# Patient Record
Sex: FEMALE | Race: WHITE | NOT HISPANIC OR LATINO | Employment: UNEMPLOYED | URBAN - METROPOLITAN AREA
[De-identification: names, ages, dates, MRNs, and addresses within clinical notes are randomized per-mention and may not be internally consistent; named-entity substitution may affect disease eponyms.]

---

## 2018-01-31 ENCOUNTER — OFFICE VISIT CONVERTED (OUTPATIENT)
Dept: FAMILY MEDICINE CLINIC | Facility: CLINIC | Age: 33
End: 2018-01-31
Attending: NURSE PRACTITIONER

## 2018-03-30 ENCOUNTER — OFFICE VISIT CONVERTED (OUTPATIENT)
Dept: FAMILY MEDICINE CLINIC | Facility: CLINIC | Age: 33
End: 2018-03-30
Attending: NURSE PRACTITIONER

## 2018-10-12 ENCOUNTER — CONVERSION ENCOUNTER (OUTPATIENT)
Dept: FAMILY MEDICINE CLINIC | Facility: CLINIC | Age: 33
End: 2018-10-12

## 2018-10-12 ENCOUNTER — OFFICE VISIT CONVERTED (OUTPATIENT)
Dept: FAMILY MEDICINE CLINIC | Facility: CLINIC | Age: 33
End: 2018-10-12
Attending: NURSE PRACTITIONER

## 2019-04-19 ENCOUNTER — HOSPITAL ENCOUNTER (OUTPATIENT)
Dept: LAB | Facility: HOSPITAL | Age: 34
Discharge: HOME | End: 2019-04-19
Attending: NURSE PRACTITIONER

## 2019-04-19 LAB
25(OH)D3 SERPL-MCNC: 47.6 NG/ML (ref 30–100)
ALBUMIN SERPL-MCNC: 4.2 G/DL (ref 3.5–5)
ALBUMIN/GLOB SERPL: 1.3 {RATIO} (ref 1.4–2.6)
ALP SERPL-CCNC: 118 U/L (ref 42–98)
ALT SERPL-CCNC: 21 U/L (ref 10–40)
ANION GAP SERPL CALC-SCNC: 12 MMOL/L (ref 8–19)
AST SERPL-CCNC: 24 U/L (ref 15–50)
BASOPHILS # BLD AUTO: 0.04 10*3/UL (ref 0–0.2)
BASOPHILS NFR BLD AUTO: 0.7 % (ref 0–3)
BILIRUB SERPL-MCNC: 0.26 MG/DL (ref 0.2–1.3)
BUN SERPL-MCNC: 15 MG/DL (ref 5–25)
BUN/CREAT SERPL: 19 {RATIO} (ref 6–20)
CALCIUM SERPL-MCNC: 9 MG/DL (ref 8.7–10.4)
CHLORIDE SERPL-SCNC: 104 MMOL/L (ref 99–111)
CHOLEST SERPL-MCNC: 178 MG/DL (ref 107–200)
CHOLEST/HDLC SERPL: 2.9 {RATIO} (ref 3–6)
CONV ABS IMM GRAN: 0.02 10*3/UL (ref 0–0.2)
CONV CO2: 27 MMOL/L (ref 22–32)
CONV IMMATURE GRAN: 0.3 % (ref 0–1.8)
CONV TOTAL PROTEIN: 7.4 G/DL (ref 6.3–8.2)
CREAT UR-MCNC: 0.8 MG/DL (ref 0.5–0.9)
DEPRECATED RDW RBC AUTO: 42.5 FL (ref 36.4–46.3)
EOSINOPHIL # BLD AUTO: 0.3 10*3/UL (ref 0–0.7)
EOSINOPHIL # BLD AUTO: 5.2 % (ref 0–7)
ERYTHROCYTE [DISTWIDTH] IN BLOOD BY AUTOMATED COUNT: 12.6 % (ref 11.7–14.4)
GFR SERPLBLD BASED ON 1.73 SQ M-ARVRAT: >60 ML/MIN/{1.73_M2}
GLOBULIN UR ELPH-MCNC: 3.2 G/DL (ref 2–3.5)
GLUCOSE SERPL-MCNC: 88 MG/DL (ref 65–99)
HBA1C MFR BLD: 12.5 G/DL (ref 12–16)
HCT VFR BLD AUTO: 39.6 % (ref 37–47)
HDLC SERPL-MCNC: 61 MG/DL (ref 40–60)
LDLC SERPL CALC-MCNC: 109 MG/DL (ref 70–100)
LYMPHOCYTES # BLD AUTO: 2.15 10*3/UL (ref 1–5)
MCH RBC QN AUTO: 29.1 PG (ref 27–31)
MCHC RBC AUTO-ENTMCNC: 31.6 G/DL (ref 33–37)
MCV RBC AUTO: 92.1 FL (ref 81–99)
MONOCYTES # BLD AUTO: 0.37 10*3/UL (ref 0.2–1.2)
MONOCYTES NFR BLD AUTO: 6.4 % (ref 3–10)
NEUTROPHILS # BLD AUTO: 2.91 10*3/UL (ref 2–8)
NEUTROPHILS NFR BLD AUTO: 50.3 % (ref 30–85)
NRBC CBCN: 0 % (ref 0–0.7)
OSMOLALITY SERPL CALC.SUM OF ELEC: 288 MOSM/KG (ref 273–304)
PLATELET # BLD AUTO: 371 10*3/UL (ref 130–400)
PMV BLD AUTO: 9.9 FL (ref 9.4–12.3)
POTASSIUM SERPL-SCNC: 4.4 MMOL/L (ref 3.5–5.3)
RBC # BLD AUTO: 4.3 10*6/UL (ref 4.2–5.4)
SODIUM SERPL-SCNC: 139 MMOL/L (ref 135–147)
TRIGL SERPL-MCNC: 41 MG/DL (ref 40–150)
VARIANT LYMPHS NFR BLD MANUAL: 37.1 % (ref 20–45)
VLDLC SERPL-MCNC: 8 MG/DL (ref 5–37)
WBC # BLD AUTO: 5.79 10*3/UL (ref 4.8–10.8)

## 2019-04-20 LAB
T4 FREE SERPL-MCNC: 1.6 NG/DL (ref 0.9–1.8)
TSH SERPL-ACNC: 1.04 M[IU]/L (ref 0.27–4.2)

## 2019-04-22 ENCOUNTER — OFFICE VISIT CONVERTED (OUTPATIENT)
Dept: FAMILY MEDICINE CLINIC | Facility: CLINIC | Age: 34
End: 2019-04-22
Attending: NURSE PRACTITIONER

## 2019-05-03 ENCOUNTER — OFFICE VISIT CONVERTED (OUTPATIENT)
Dept: FAMILY MEDICINE CLINIC | Facility: CLINIC | Age: 34
End: 2019-05-03
Attending: NURSE PRACTITIONER

## 2019-05-03 ENCOUNTER — HOSPITAL ENCOUNTER (OUTPATIENT)
Dept: FAMILY MEDICINE CLINIC | Facility: CLINIC | Age: 34
Discharge: HOME | End: 2019-05-03
Attending: NURSE PRACTITIONER

## 2019-05-08 LAB
CONV LAST MENSTURAL PERIOD: NORMAL
SPECIMEN SOURCE: NORMAL
SPECIMEN SOURCE: NORMAL
THIN PREP CVX: NORMAL

## 2020-01-02 ENCOUNTER — HOSPITAL ENCOUNTER (OUTPATIENT)
Dept: URGENT CARE | Facility: CLINIC | Age: 35
Discharge: HOME | End: 2020-01-02
Attending: EMERGENCY MEDICINE

## 2020-01-04 LAB — BACTERIA SPEC AEROBE CULT: NORMAL

## 2020-04-24 ENCOUNTER — HOSPITAL ENCOUNTER (OUTPATIENT)
Dept: LAB | Facility: HOSPITAL | Age: 35
Discharge: HOME | End: 2020-04-24
Attending: NURSE PRACTITIONER

## 2020-04-24 LAB
25(OH)D3 SERPL-MCNC: 46 NG/ML (ref 30–100)
ALBUMIN SERPL-MCNC: 4.1 G/DL (ref 3.5–5)
ALBUMIN/GLOB SERPL: 1.3 {RATIO} (ref 1.4–2.6)
ALP SERPL-CCNC: 122 U/L (ref 42–98)
ALT SERPL-CCNC: 16 U/L (ref 10–40)
ANION GAP SERPL CALC-SCNC: 16 MMOL/L (ref 8–19)
AST SERPL-CCNC: 16 U/L (ref 15–50)
BASOPHILS # BLD AUTO: 0.04 10*3/UL (ref 0–0.2)
BASOPHILS NFR BLD AUTO: 0.6 % (ref 0–3)
BILIRUB SERPL-MCNC: 0.52 MG/DL (ref 0.2–1.3)
BUN SERPL-MCNC: 14 MG/DL (ref 5–25)
BUN/CREAT SERPL: 20 {RATIO} (ref 6–20)
CALCIUM SERPL-MCNC: 9 MG/DL (ref 8.7–10.4)
CHLORIDE SERPL-SCNC: 100 MMOL/L (ref 99–111)
CHOLEST SERPL-MCNC: 154 MG/DL (ref 107–200)
CHOLEST/HDLC SERPL: 3.1 {RATIO} (ref 3–6)
CONV ABS IMM GRAN: 0.02 10*3/UL (ref 0–0.2)
CONV CO2: 22 MMOL/L (ref 22–32)
CONV IMMATURE GRAN: 0.3 % (ref 0–1.8)
CONV TOTAL PROTEIN: 7.2 G/DL (ref 6.3–8.2)
CREAT UR-MCNC: 0.71 MG/DL (ref 0.5–0.9)
DEPRECATED RDW RBC AUTO: 42 FL (ref 36.4–46.3)
EOSINOPHIL # BLD AUTO: 0.16 10*3/UL (ref 0–0.7)
EOSINOPHIL # BLD AUTO: 2.5 % (ref 0–7)
ERYTHROCYTE [DISTWIDTH] IN BLOOD BY AUTOMATED COUNT: 12.6 % (ref 11.7–14.4)
GFR SERPLBLD BASED ON 1.73 SQ M-ARVRAT: >60 ML/MIN/{1.73_M2}
GLOBULIN UR ELPH-MCNC: 3.1 G/DL (ref 2–3.5)
GLUCOSE SERPL-MCNC: 87 MG/DL (ref 65–99)
HCT VFR BLD AUTO: 40.9 % (ref 37–47)
HDLC SERPL-MCNC: 49 MG/DL (ref 40–60)
HGB BLD-MCNC: 13.1 G/DL (ref 12–16)
LDLC SERPL CALC-MCNC: 93 MG/DL (ref 70–100)
LYMPHOCYTES # BLD AUTO: 2.04 10*3/UL (ref 1–5)
LYMPHOCYTES NFR BLD AUTO: 31.5 % (ref 20–45)
MCH RBC QN AUTO: 29.1 PG (ref 27–31)
MCHC RBC AUTO-ENTMCNC: 32 G/DL (ref 33–37)
MCV RBC AUTO: 90.9 FL (ref 81–99)
MONOCYTES # BLD AUTO: 0.46 10*3/UL (ref 0.2–1.2)
MONOCYTES NFR BLD AUTO: 7.1 % (ref 3–10)
NEUTROPHILS # BLD AUTO: 3.76 10*3/UL (ref 2–8)
NEUTROPHILS NFR BLD AUTO: 58 % (ref 30–85)
NRBC CBCN: 0 % (ref 0–0.7)
OSMOLALITY SERPL CALC.SUM OF ELEC: 278 MOSM/KG (ref 273–304)
PLATELET # BLD AUTO: 308 10*3/UL (ref 130–400)
PMV BLD AUTO: 10.2 FL (ref 9.4–12.3)
POTASSIUM SERPL-SCNC: 3.9 MMOL/L (ref 3.5–5.3)
RBC # BLD AUTO: 4.5 10*6/UL (ref 4.2–5.4)
SODIUM SERPL-SCNC: 134 MMOL/L (ref 135–147)
T4 FREE SERPL-MCNC: 1.6 NG/DL (ref 0.9–1.8)
TRIGL SERPL-MCNC: 62 MG/DL (ref 40–150)
TSH SERPL-ACNC: 1.46 M[IU]/L (ref 0.27–4.2)
VLDLC SERPL-MCNC: 12 MG/DL (ref 5–37)
WBC # BLD AUTO: 6.48 10*3/UL (ref 4.8–10.8)

## 2020-04-27 ENCOUNTER — CONVERSION ENCOUNTER (OUTPATIENT)
Dept: FAMILY MEDICINE CLINIC | Facility: CLINIC | Age: 35
End: 2020-04-27

## 2020-04-27 ENCOUNTER — OFFICE VISIT CONVERTED (OUTPATIENT)
Dept: FAMILY MEDICINE CLINIC | Facility: CLINIC | Age: 35
End: 2020-04-27
Attending: NURSE PRACTITIONER

## 2020-05-29 ENCOUNTER — OFFICE VISIT CONVERTED (OUTPATIENT)
Dept: FAMILY MEDICINE CLINIC | Facility: CLINIC | Age: 35
End: 2020-05-29
Attending: NURSE PRACTITIONER

## 2020-06-29 ENCOUNTER — OFFICE VISIT CONVERTED (OUTPATIENT)
Dept: FAMILY MEDICINE CLINIC | Facility: CLINIC | Age: 35
End: 2020-06-29
Attending: NURSE PRACTITIONER

## 2020-07-29 ENCOUNTER — OFFICE VISIT CONVERTED (OUTPATIENT)
Dept: FAMILY MEDICINE CLINIC | Facility: CLINIC | Age: 35
End: 2020-07-29
Attending: NURSE PRACTITIONER

## 2020-09-01 ENCOUNTER — OFFICE VISIT CONVERTED (OUTPATIENT)
Dept: FAMILY MEDICINE CLINIC | Facility: CLINIC | Age: 35
End: 2020-09-01
Attending: NURSE PRACTITIONER

## 2021-03-09 ENCOUNTER — HOSPITAL ENCOUNTER (OUTPATIENT)
Dept: LAB | Facility: HOSPITAL | Age: 36
Discharge: HOME | End: 2021-03-09
Attending: NURSE PRACTITIONER

## 2021-03-09 LAB
ALBUMIN SERPL-MCNC: 3.8 G/DL (ref 3.5–5)
ALBUMIN/GLOB SERPL: 1.2 {RATIO} (ref 1.4–2.6)
ALP SERPL-CCNC: 116 U/L (ref 42–98)
ALT SERPL-CCNC: 10 U/L (ref 10–40)
ANION GAP SERPL CALC-SCNC: 12 MMOL/L (ref 8–19)
AST SERPL-CCNC: 16 U/L (ref 15–50)
BASOPHILS # BLD AUTO: 0.04 10*3/UL (ref 0–0.2)
BASOPHILS NFR BLD AUTO: 0.7 % (ref 0–3)
BILIRUB SERPL-MCNC: 0.38 MG/DL (ref 0.2–1.3)
BUN SERPL-MCNC: 14 MG/DL (ref 5–25)
BUN/CREAT SERPL: 19 {RATIO} (ref 6–20)
CALCIUM SERPL-MCNC: 8.8 MG/DL (ref 8.7–10.4)
CHLORIDE SERPL-SCNC: 104 MMOL/L (ref 99–111)
CHOLEST SERPL-MCNC: 146 MG/DL (ref 107–200)
CHOLEST/HDLC SERPL: 2.6 {RATIO} (ref 3–6)
CONV ABS IMM GRAN: 0.02 10*3/UL (ref 0–0.2)
CONV CO2: 27 MMOL/L (ref 22–32)
CONV IMMATURE GRAN: 0.4 % (ref 0–1.8)
CONV TOTAL PROTEIN: 6.9 G/DL (ref 6.3–8.2)
CREAT UR-MCNC: 0.73 MG/DL (ref 0.5–0.9)
DEPRECATED RDW RBC AUTO: 43 FL (ref 36.4–46.3)
EOSINOPHIL # BLD AUTO: 0.15 10*3/UL (ref 0–0.7)
EOSINOPHIL # BLD AUTO: 2.6 % (ref 0–7)
ERYTHROCYTE [DISTWIDTH] IN BLOOD BY AUTOMATED COUNT: 13 % (ref 11.7–14.4)
GFR SERPLBLD BASED ON 1.73 SQ M-ARVRAT: >60 ML/MIN/{1.73_M2}
GLOBULIN UR ELPH-MCNC: 3.1 G/DL (ref 2–3.5)
GLUCOSE SERPL-MCNC: 81 MG/DL (ref 65–99)
HCT VFR BLD AUTO: 38.6 % (ref 37–47)
HDLC SERPL-MCNC: 56 MG/DL (ref 40–60)
HGB BLD-MCNC: 12.4 G/DL (ref 12–16)
LDLC SERPL CALC-MCNC: 76 MG/DL (ref 70–100)
LYMPHOCYTES # BLD AUTO: 1.8 10*3/UL (ref 1–5)
LYMPHOCYTES NFR BLD AUTO: 31.6 % (ref 20–45)
MCH RBC QN AUTO: 29.5 PG (ref 27–31)
MCHC RBC AUTO-ENTMCNC: 32.1 G/DL (ref 33–37)
MCV RBC AUTO: 91.9 FL (ref 81–99)
MONOCYTES # BLD AUTO: 0.39 10*3/UL (ref 0.2–1.2)
MONOCYTES NFR BLD AUTO: 6.9 % (ref 3–10)
NEUTROPHILS # BLD AUTO: 3.29 10*3/UL (ref 2–8)
NEUTROPHILS NFR BLD AUTO: 57.8 % (ref 30–85)
NRBC CBCN: 0 % (ref 0–0.7)
OSMOLALITY SERPL CALC.SUM OF ELEC: 288 MOSM/KG (ref 273–304)
PLATELET # BLD AUTO: 314 10*3/UL (ref 130–400)
PMV BLD AUTO: 9.8 FL (ref 9.4–12.3)
POTASSIUM SERPL-SCNC: 4.1 MMOL/L (ref 3.5–5.3)
RBC # BLD AUTO: 4.2 10*6/UL (ref 4.2–5.4)
SODIUM SERPL-SCNC: 139 MMOL/L (ref 135–147)
TRIGL SERPL-MCNC: 68 MG/DL (ref 40–150)
TSH SERPL-ACNC: 3 M[IU]/L (ref 0.27–4.2)
VLDLC SERPL-MCNC: 14 MG/DL (ref 5–37)
WBC # BLD AUTO: 5.69 10*3/UL (ref 4.8–10.8)

## 2021-03-24 ENCOUNTER — OFFICE VISIT CONVERTED (OUTPATIENT)
Dept: FAMILY MEDICINE CLINIC | Facility: CLINIC | Age: 36
End: 2021-03-24
Attending: NURSE PRACTITIONER

## 2021-03-24 ENCOUNTER — CONVERSION ENCOUNTER (OUTPATIENT)
Dept: FAMILY MEDICINE CLINIC | Facility: CLINIC | Age: 36
End: 2021-03-24

## 2021-05-12 NOTE — PROGRESS NOTES
Progress Note      Patient Name: Jeni De Santiago   Patient ID: [id number]   Sex: Female   YOB: 1985        Visit Date: April 27, 2020    Provider: BRETT Orourke   Location: [facility]   Location Address: [address] [place] [address], [address] [place] [address]  [id number]   Location Phone: [phone]          Chief Complaint  · 1 year follow up   · Discuss weight management   · Patient is on no carbs or sugar diet       History Of Present Illness  Jeni De Santiago is a 35 year old /White female who presents for evaluation and treatment of:      Patient presents to the office today for one-year follow-up.  I did review all her recent lab work with the patient which is unremarkable.  Patient does state that she has reduced her carbs or sugars and stop drinking sodas.  Patient states that she is increased her activity yet she is not losing any weight.  She does want to discuss weight management options.  We did discuss medications including the risk and benefits as well as adverse side effects.       Past Medical History  Elevated blood pressure; High blood pressure; Snoring; Weight gain         Past Surgical History  *Denies any surgical procedures         Medication List  lisinopril 10 mg oral tablet; Synthroid 100 mcg oral tablet; Vitamin D3 50 mcg (2,000 unit) oral capsule         Allergy List  NO KNOWN DRUG ALLERGIES         Family Medical History  *No Known Family History         Social History  Alcohol (Never); Tobacco (Never)         Immunizations  Name Date Admin   Influenza Refused   Influenza Refused         Review of Systems  · Constitutional  o Denies  o : fever, fatigue, weight loss, weight gain  · Cardiovascular  o Denies  o : lower extremity edema, claudication, chest pressure, palpitations  · Respiratory  o Denies  o : shortness of breath, wheezing, cough, hemoptysis, dyspnea on exertion  · Gastrointestinal  o Denies  o : nausea, vomiting, diarrhea, constipation, abdominal  "pain      Vitals  Date Time BP Position Site L\R Cuff Size HR RR TEMP (F) WT  HT  BMI kg/m2 BSA m2 O2 Sat HC       04/27/2020 03:06 /70 Sitting    94 - R 18 98.9 180lbs 0oz 5'  3\" 31.89 1.91 98 %          Physical Examination  · Constitutional  o Appearance  o : well-nourished, in no acute distress  · Neck  o Inspection/Palpation  o : normal appearance, no masses or tenderness, trachea midline  o Range of Motion  o : cervical range of motion within normal limits  o Thyroid  o : gland size normal, nontender, no nodules or masses present on palpation  · Respiratory  o Respiratory Effort  o : breathing unlabored  o Inspection of Chest  o : normal appearance  o Auscultation of Lungs  o : normal breath sounds throughout inspiration and expiration  · Cardiovascular  o Heart  o :   § Auscultation of Heart  § : regular rate and rhythm, no murmurs, gallops or rubs  o Peripheral Vascular System  o :   § Extremities  § : no clubbing or edema  · Skin and Subcutaneous Tissue  o General Inspection  o : no rashes or lesions present, no areas of discoloration  o Body Hair  o : hair normal for age, general body hair distribution normal for age  o Digits and Nails  o : no clubbing, cyanosis, deformities or edema present, normal appearing nails  · Neurologic  o Mental Status Examination  o :   § Orientation  § : grossly oriented to person, place and time  o Gait and Station  o : normal gait, able to stand without difficulty  · Psychiatric  o Judgement and Insight  o : judgment and insight intact  o Mood and Affect  o : mood normal, affect appropriate  o Presence of Abnormal Thoughts  o : no hallucinations, no delusions present, no psychotic thoughts          Results  · In-Office Procedures  o Lab procedure  § IOP - Urine Drug Screen In-House [facility] ([address])   § Amphetamines Ur Ql: Negative   § Barbiturates Ur Ql: Negative   § Buprenorphine+Nor Ur Ql Scn: Negative   § Benzodiaz Ur Ql: Negative   § Cocaine Ur Ql: Negative "   § Methadone Ur Ql: Negative   § Methamphet Ur Ql: Negative   § MDMA Ur Ql Scn: Negative   § Opiates Ur Ql: Negative   § Oxycodone Ur Ql: Negative   § PCP Ur Ql: Negative   § THC Ur Ql: Negative   § Temp in Range?: Within/Acceptable   § Control Seen?: Yes       Assessment  · Essential hypertension     401.9/I10  · Hypothyroidism     244.9/E03.9  · Vitamin D deficiency     268.9/E55.9  · Obesity (BMI 30-39.9)     278.00/E66.9      Plan  · Orders  o BORIS Report (KASPR) - - 04/27/2020  o ACO-17: Screened for tobacco use AND received tobacco cessation intervention (4004F) - - 04/27/2020  o ACO-39: Current medications updated and reviewed () - - 04/27/2020  o ACO-14: Influenza immunization was not administered for reasons documented () - - 04/27/2020  o Urine Drug Screen ([facility]) (53729) - - 04/27/2020  · Medications  o phentermine 37.5 mg oral tablet   SIG: take 1 tablet (37.5 mg) by oral route once daily before breakfast   DISP: (30) tablets with 0 refills  Prescribed on 04/27/2020     o Medications have been Reconciled  o Transition of Care or Provider Policy  · Instructions  o Patient advised to monitor blood pressure (B/P) at home and journal readings. Patient informed that a B/P reading at home of more than 130/80 is considered hypertension. For readings greater seox998/90 or higher patient is advised to follow up in the office with readings for management. Patient advised to limit sodium intake.  o Patient was educated/instructed on their diagnosis, treatment and medications prior to discharge from the clinic today.  o Time spent with the patient was minutes, more than 50% face to face.  o Electronically Identified Patient Education Materials Provided Electronically  · Disposition  o Call or Return if symptoms worsen or persist.  o Follow up in 1 month            Electronically Signed by: BRETT Orourke -Author on April 27, 2020 04:08:30 PM

## 2021-05-13 NOTE — PROGRESS NOTES
Progress Note      Patient Name: Jeni De Santiago   Patient ID: [id number]   Sex: Female   YOB: 1985        Visit Date: June 29, 2020    Provider: BRETT Orourke   Location: [facility]   Location Address: [address] [place] [address], [address] [place] [address]  [id number]   Location Phone: [phone]          Chief Complaint  · Follow up weight      History Of Present Illness  Jeni De Santiago is a 35 year old /White female who presents for evaluation and treatment of:      Patient presents to the office today for one-month follow-up on her weight management.  Patient's current weight is 165.  She has 5 pound weight loss since her last visit.  She denies any chest pain shortness breath palpitations this time.  She denies any constipation or dry mouth.  Patient states that she would continue this regimen.   She denies any other concerns or complaints at this time  She continues to eat healthy and increase her exercise       Past Medical History  Elevated blood pressure; High blood pressure; Snoring; Weight gain         Past Surgical History  *Denies any surgical procedures         Medication List  lisinopril 10 mg oral tablet; phentermine 37.5 mg oral tablet; Synthroid 100 mcg oral tablet; Vitamin D3 50 mcg (2,000 unit) oral capsule         Allergy List  NO KNOWN DRUG ALLERGIES         Family Medical History  *No Known Family History         Social History  Alcohol (Never); Tobacco (Never)         Immunizations  Name Date Admin   Influenza Refused   Influenza Refused         Review of Systems  · Constitutional  o Denies  o : fever, fatigue, weight loss, weight gain  · Cardiovascular  o Denies  o : lower extremity edema, claudication, chest pressure, palpitations  · Respiratory  o Denies  o : shortness of breath, wheezing, cough, hemoptysis, dyspnea on exertion  · Gastrointestinal  o Denies  o : nausea, vomiting, diarrhea, constipation, abdominal pain      Vitals  Date Time BP Position Site L\R  "Cuff Size HR RR TEMP (F) WT  HT  BMI kg/m2 BSA m2 O2 Sat HC       06/29/2020 01:08 /78 Sitting    102 - R 16 98.5 165lbs 0oz 5'  3\" 29.23 1.82 100 %          Physical Examination  · Constitutional  o Appearance  o : well-nourished, in no acute distress  · Neck  o Inspection/Palpation  o : normal appearance, no masses or tenderness, trachea midline  o Range of Motion  o : cervical range of motion within normal limits  o Thyroid  o : gland size normal, nontender, no nodules or masses present on palpation  · Respiratory  o Respiratory Effort  o : breathing unlabored  o Inspection of Chest  o : normal appearance  o Auscultation of Lungs  o : normal breath sounds throughout inspiration and expiration  · Cardiovascular  o Heart  o :   § Auscultation of Heart  § : regular rate and rhythm, no murmurs, gallops or rubs  o Peripheral Vascular System  o :   § Extremities  § : no clubbing or edema  · Skin and Subcutaneous Tissue  o General Inspection  o : no rashes or lesions present, no areas of discoloration  o Body Hair  o : hair normal for age, general body hair distribution normal for age  o Digits and Nails  o : no clubbing, cyanosis, deformities or edema present, normal appearing nails  · Neurologic  o Mental Status Examination  o :   § Orientation  § : grossly oriented to person, place and time  o Gait and Station  o : normal gait, able to stand without difficulty  · Psychiatric  o Judgement and Insight  o : judgment and insight intact  o Mood and Affect  o : mood normal, affect appropriate  o Presence of Abnormal Thoughts  o : no hallucinations, no delusions present, no psychotic thoughts          Assessment  · Overweight     278.02/E66.3  · BMI 29.0-29.9,adult     V85.25/Z68.29    Problems Reconciled  Plan  · Orders  o ACO-39: Current medications updated and reviewed () - - 06/29/2020  o ACO-14: Influenza immunization was not administered for reasons documented () - - " 06/29/2020  · Medications  o phentermine 37.5 mg oral tablet   SIG: take 1 tablet (37.5 mg) by oral route once daily before breakfast   DISP: (30) tablets with 0 refills  Refilled on 06/29/2020     o Medications have been Reconciled  o Transition of Care or Provider Policy  · Instructions  o Patient was educated/instructed on their diagnosis, treatment and medications prior to discharge from the clinic today.  o Time spent with the patient was minutes, more than 50% face to face.  o Electronically Identified Patient Education Materials Provided Electronically  · Disposition  o Call or Return if symptoms worsen or persist.  o Follow up in 1 month            Electronically Signed by: BRETT Orourke -Author on June 29, 2020 01:16:14 PM

## 2021-05-13 NOTE — PROGRESS NOTES
Progress Note      Patient Name: Jeni De Santiago   Patient ID: [id number]   Sex: Female   YOB: 1985    Primary Care Provider: Rafael WEST    Visit Date: July 29, 2020    Provider: BRETT Orourke   Location: [facility]   Location Address: [address] [place] [address], [address]  [id number]   Location Phone: [phone]          Chief Complaint     1 month follow up on weight       History Of Present Illness  Jeni De Santiago is a 35 year old /White female who presents for evaluation and treatment of:      Pt presents to the office today for f/u of weight loss on phentermine.  She is down 7 pounds since her last months visit.  She denies any side effects or problems with the medication.    Due to the patient losing 7 pounds we will continue the medication for 1 more month.       Past Medical History  Disease Name Date Onset Notes   Elevated blood pressure 12/13/2016 --    High blood pressure --  --    Snoring 12/13/2016 --    Weight gain 12/13/2016 --          Past Surgical History  Procedure Name Date Notes   *Denies any surgical procedures --  --          Medication List  Name Date Started Instructions   lisinopril 10 mg oral tablet 04/10/2020 take 1 tablet (10 mg) by oral route once daily   phentermine 37.5 mg oral tablet 07/29/2020 take 1 tablet (37.5 mg) by oral route once daily before breakfast   Synthroid 100 mcg oral tablet 01/29/2020 take 1 tablet (100 mcg) by oral route once daily for 90 days   Vitamin D3 50 mcg (2,000 unit) oral capsule 04/10/2020 take 1 capsule by oral route daily for 90 days         Allergy List  Allergen Name Date Reaction Notes   NO KNOWN DRUG ALLERGIES --  --  --          Family Medical History  Disease Name Relative/Age Notes   *No Known Family History  --          Social History  Finding Status Start/Stop Quantity Notes   Alcohol Never --/-- --  --    Tobacco Never --/-- --  --          Immunizations  NameDate Admin Mfg Trade Name Lot Number  "Route Inj VIS Given VIS Publication   InfluenzaRefused 04/27/2020 NE Not Entered  NE NE     Comments:    InfluenzaRefused 05/03/2019 NE Not Entered  NE NE     Comments:          Review of Systems  · Constitutional  o Denies  o : fatigue, night sweats  · Eyes  o Denies  o : double vision, blurred vision  · HENT  o Denies  o : vertigo, recent head injury  · Breasts  o Denies  o : abnormal changes in breast size, additional breast symptoms except as noted in the HPI  · Cardiovascular  o Denies  o : chest pain, irregular heart beats  · Respiratory  o Denies  o : shortness of breath, productive cough  · Gastrointestinal  o Denies  o : nausea, vomiting  · Genitourinary  o Denies  o : dysuria, urinary retention  · Integument  o Denies  o : hair growth change, new skin lesions  · Neurologic  o Denies  o : altered mental status, seizures  · Musculoskeletal  o Denies  o : joint swelling, limitation of motion  · Endocrine  o Denies  o : cold intolerance, heat intolerance  · Heme-Lymph  o Denies  o : petechiae, lymph node enlargement or tenderness  · Allergic-Immunologic  o Denies  o : frequent illnesses      Vitals  Date Time BP Position Site L\R Cuff Size HR RR TEMP (F) WT  HT  BMI kg/m2 BSA m2 O2 Sat        07/29/2020 01:09 /72 Sitting    102 - R 18 97.8 157lbs 16oz 5'  3\" 27.99 1.78 97 %          Physical Examination  · Constitutional  o Appearance  o : well-nourished, in no acute distress  · Eyes  o Conjunctivae  o : conjunctivae normal  o Sclerae  o : sclerae white  o Pupils and Irises  o : pupils equal and round  o Eyelids/Ocular Adnexae  o : eyelid appearance normal, no exudates present  · Respiratory  o Respiratory Effort  o : breathing unlabored  o Inspection of Chest  o : normal appearance  o Auscultation of Lungs  o : normal breath sounds throughout inspiration and expiration  · Cardiovascular  o Heart  o :   § Auscultation of Heart  § : regular rate and rhythm, no murmurs, gallops or rubs  o Peripheral " Vascular System  o :   § Extremities  § : no clubbing or edema  · Skin and Subcutaneous Tissue  o General Inspection  o : no rashes or lesions present, no areas of discoloration  o Body Hair  o : hair normal for age, general body hair distribution normal for age  o Digits and Nails  o : no clubbing, cyanosis, deformities or edema present, normal appearing nails  · Neurologic  o Mental Status Examination  o :   § Orientation  § : grossly oriented to person, place and time  o Gait and Station  o : normal gait, able to stand without difficulty  · Psychiatric  o Judgement and Insight  o : judgment and insight intact  o Mood and Affect  o : mood normal, affect appropriate  o Presence of Abnormal Thoughts  o : no hallucinations, no delusions present, no psychotic thoughts          Assessment  · Overweight     278.02/E66.3  · BMI 27.0-27.9,adult     V85.23/Z68.27    Problems Reconciled  Plan  · Orders  o ACO-39: Current medications updated and reviewed () - - 07/29/2020  o ACO-14: Influenza immunization was not administered for reasons documented () - - 07/29/2020  · Medications  o phentermine 37.5 mg oral tablet   SIG: take 1 tablet (37.5 mg) by oral route once daily before breakfast   DISP: (30) tablets with 0 refills  Refilled on 07/29/2020     o Medications have been Reconciled  o Transition of Care or Provider Policy  · Instructions  o Patient was educated/instructed on their diagnosis, treatment and medications prior to discharge from the clinic today.  o Time spent with the patient was minutes, more than 50% face to face.  o Electronically Identified Patient Education Materials Provided Electronically  · Disposition  o Call or Return if symptoms worsen or persist.  o Follow up in 1 month            Electronically Signed by: BRETT Orourke -Author on July 29, 2020 01:27:09 PM

## 2021-05-13 NOTE — PROGRESS NOTES
Progress Note      Patient Name: Jeni De Santiago   Patient ID: [id number]   Sex: Female   YOB: 1985    Primary Care Provider: Rafael WEST    Visit Date: September 1, 2020    Provider: BRETT Orourke   Location: [facility]   Location Address: [address] [place] [address], [address]  [id number]   Location Phone: [phone]          Chief Complaint  · 1 month follow up for Phentermine      History Of Present Illness  Jeni De Santiago is a 35 year old /White female who presents for evaluation and treatment of:      .patient presents to the office today for follow-up regarding her weight management.  Patient's weight is 157 pounds which is 1 pound weight loss since her last visit.  I did discuss with the patient that we would need to discontinue her phentermine as the risks are outweighing the benefits at this point.    Patient does state that she has been having increased levels of anxiety.  We did discuss medication therapy to help reduce her stress and anxiety.       Past Medical History  Disease Name Date Onset Notes   Elevated blood pressure 12/13/2016 --    High blood pressure --  --    Snoring 12/13/2016 --    Weight gain 12/13/2016 --          Past Surgical History  Procedure Name Date Notes   *Denies any surgical procedures --  --          Medication List  Name Date Started Instructions   lisinopril 10 mg oral tablet 04/10/2020 take 1 tablet (10 mg) by oral route once daily   phentermine 37.5 mg oral tablet 07/29/2020 take 1 tablet (37.5 mg) by oral route once daily before breakfast   Synthroid 100 mcg oral tablet 07/29/2020 take 1 tablet (100 mcg) by oral route once daily for 90 days   Vitamin D3 50 mcg (2,000 unit) oral capsule 04/10/2020 take 1 capsule by oral route daily for 90 days         Allergy List  Allergen Name Date Reaction Notes   NO KNOWN DRUG ALLERGIES --  --  --          Family Medical History  Disease Name Relative/Age Notes   *No Known  "Family History  --          Social History  Finding Status Start/Stop Quantity Notes   Alcohol Never --/-- --  --    Tobacco Never --/-- --  --          Immunizations  NameDate Admin Mfg Trade Name Lot Number Route Inj VIS Given VIS Publication   InfluenzaRefused 04/27/2020 NE Not Entered  NE NE     Comments:    InfluenzaRefused 05/03/2019 NE Not Entered  NE NE     Comments:          Review of Systems  · Constitutional  o Denies  o : fatigue, night sweats  · Eyes  o Denies  o : double vision, blurred vision  · HENT  o Denies  o : vertigo, recent head injury  · Breasts  o Denies  o : abnormal changes in breast size, additional breast symptoms except as noted in the HPI  · Cardiovascular  o Denies  o : chest pain, irregular heart beats  · Respiratory  o Denies  o : shortness of breath, productive cough  · Gastrointestinal  o Denies  o : nausea, vomiting  · Genitourinary  o Denies  o : dysuria, urinary retention  · Integument  o Denies  o : hair growth change, new skin lesions  · Neurologic  o Denies  o : altered mental status, seizures  · Musculoskeletal  o Denies  o : joint swelling, limitation of motion  · Endocrine  o Denies  o : cold intolerance, heat intolerance  · Heme-Lymph  o Denies  o : petechiae, lymph node enlargement or tenderness  · Allergic-Immunologic  o Denies  o : frequent illnesses      Vitals  Date Time BP Position Site L\R Cuff Size HR RR TEMP (F) WT  HT  BMI kg/m2 BSA m2 O2 Sat        09/01/2020 01:14 /72 Sitting    82 - R 18 98.1 156lbs 16oz 5'  3\" 27.81 1.78 97 %          Physical Examination  · Constitutional  o Appearance  o : well-nourished, in no acute distress  · Neck  o Inspection/Palpation  o : normal appearance, no masses or tenderness, trachea midline  o Range of Motion  o : cervical range of motion within normal limits  o Thyroid  o : gland size normal, nontender, no nodules or masses present on palpation  · Respiratory  o Respiratory Effort  o : breathing " unlabored  o Inspection of Chest  o : normal appearance  o Auscultation of Lungs  o : normal breath sounds throughout inspiration and expiration  · Cardiovascular  o Heart  o :   § Auscultation of Heart  § : regular rate and rhythm, no murmurs, gallops or rubs  o Peripheral Vascular System  o :   § Extremities  § : no clubbing or edema  · Skin and Subcutaneous Tissue  o General Inspection  o : no rashes or lesions present, no areas of discoloration  o Body Hair  o : hair normal for age, general body hair distribution normal for age  o Digits and Nails  o : no clubbing, cyanosis, deformities or edema present, normal appearing nails  · Neurologic  o Mental Status Examination  o :   § Orientation  § : grossly oriented to person, place and time  o Gait and Station  o : normal gait, able to stand without difficulty  · Psychiatric  o Judgement and Insight  o : judgment and insight intact  o Mood and Affect  o : mood normal, affect appropriate  o Presence of Abnormal Thoughts  o : no hallucinations, no delusions present, no psychotic thoughts          Assessment  · Anxiety disorder     300.00/F41.9  · Overweight     278.02/E66.3    Problems Reconciled  Plan  · Orders  o ACO-39: Current medications updated and reviewed () - - 09/01/2020  o ACO-14: Influenza immunization was not administered for reasons documented () - - 09/01/2020  · Medications  o Wellbutrin  mg oral tablet extended release 24 hr   SIG: take 1 tablet (150 mg) by oral route once daily   DISP: (30) tablets with 2 refills  Prescribed on 09/01/2020     o Medications have been Reconciled  o Transition of Care or Provider Policy  · Instructions  o Discussed the need for therapy, either with a certified counselor, psychologist, and/or family . If no improvement is noted or worsening of their condition, return to office or ER. But also discussed with patient that if they are non-responsive to the type of medication they may need to see a  psychiatrist for further evaluation and management.  o Patient was educated/instructed on their diagnosis, treatment and medications prior to discharge from the clinic today.  o Time spent with the patient was minutes, more than 50% face to face.  o Electronically Identified Patient Education Materials Provided Electronically  · Disposition  o Call or Return if symptoms worsen or persist.  o follow up in 6 months            Electronically Signed by: BRETT Orourke -Author on September 1, 2020 02:07:23 PM

## 2021-05-13 NOTE — PROGRESS NOTES
Progress Note      Patient Name: Jeni De Santiago   Patient ID: [id number]   Sex: Female   YOB: 1985        Visit Date: May 29, 2020    Provider: BRETT Orourke   Location: [facility]   Location Address: [address] [place] [address], [address] [place] [address]  [id number]   Location Phone: [phone]          Chief Complaint     The patient is here for a one month f/u of weight.       History Of Present Illness  Jeni De Santiago is a 35 year old /White female who presents for evaluation and treatment of:      Presents to the office today for one-month follow-up regarding her weight management.  Patient's current weight is at 170 pounds which shows a 10 pound weight loss since her last visit.  She denies any chest pain shortness breath palpitations at this time.  She denies any other side effects at this time.  She states that she would like to continue this medication regimen.  Patient does state that she is increased her activity and is eating much healthier.       Past Medical History  Disease Name Date Onset Notes   Elevated blood pressure 12/13/2016 --    High blood pressure --  --    Snoring 12/13/2016 --    Weight gain 12/13/2016 --          Past Surgical History  Procedure Name Date Notes   *Denies any surgical procedures --  --          Medication List  Name Date Started Instructions   lisinopril 10 mg oral tablet 04/10/2020 take 1 tablet (10 mg) by oral route once daily   phentermine 37.5 mg oral tablet 04/27/2020 take 1 tablet (37.5 mg) by oral route once daily before breakfast   Synthroid 100 mcg oral tablet 01/29/2020 take 1 tablet (100 mcg) by oral route once daily for 90 days   Vitamin D3 50 mcg (2,000 unit) oral capsule 04/10/2020 take 1 capsule by oral route daily for 90 days         Allergy List  Allergen Name Date Reaction Notes   NO KNOWN DRUG ALLERGIES --  --  --          Family Medical History  Disease Name Relative/Age Notes   *No Known Family History  --          Social  "History  Finding Status Start/Stop Quantity Notes   Alcohol Never --/-- --  --    Tobacco Never --/-- --  --          Immunizations  NameDate Admin Mfg Trade Name Lot Number Route Inj VIS Given VIS Publication   InfluenzaRefused 04/27/2020 NE Not Entered  NE NE     Comments:    InfluenzaRefused 05/03/2019 NE Not Entered  NE NE     Comments:          Review of Systems  · Constitutional  o Denies  o : fever, fatigue  · Cardiovascular  o Denies  o : lower extremity edema, claudication, chest pressure, palpitations  · Respiratory  o Denies  o : shortness of breath, wheezing, cough, hemoptysis, dyspnea on exertion  · Gastrointestinal  o Denies  o : nausea, vomiting, diarrhea, constipation, abdominal pain      Vitals  Date Time BP Position Site L\R Cuff Size HR RR TEMP (F) WT  HT  BMI kg/m2 BSA m2 O2 Sat        05/29/2020 01:17 /76 Sitting    104 - R 16 99 170lbs 0oz 5'  3\" 30.11 1.85 95 %          Physical Examination  · Constitutional  o Appearance  o : well-nourished, in no acute distress  · Neck  o Inspection/Palpation  o : normal appearance, no masses or tenderness, trachea midline  o Range of Motion  o : cervical range of motion within normal limits  o Thyroid  o : gland size normal, nontender, no nodules or masses present on palpation  · Respiratory  o Respiratory Effort  o : breathing unlabored  o Inspection of Chest  o : normal appearance  o Auscultation of Lungs  o : normal breath sounds throughout inspiration and expiration  · Cardiovascular  o Heart  o :   § Auscultation of Heart  § : regular rate and rhythm, no murmurs, gallops or rubs  o Peripheral Vascular System  o :   § Extremities  § : no clubbing or edema  · Skin and Subcutaneous Tissue  o General Inspection  o : no rashes or lesions present, no areas of discoloration  o Body Hair  o : hair normal for age, general body hair distribution normal for age  o Digits and Nails  o : no clubbing, cyanosis, deformities or edema present, normal appearing " nails  · Neurologic  o Mental Status Examination  o :   § Orientation  § : grossly oriented to person, place and time  o Gait and Station  o : normal gait, able to stand without difficulty  · Psychiatric  o Judgement and Insight  o : judgment and insight intact  o Mood and Affect  o : mood normal, affect appropriate  o Presence of Abnormal Thoughts  o : no hallucinations, no delusions present, no psychotic thoughts              Assessment  · Screening for depression     V79.0/Z13.89  · Hypothyroidism     244.9/E03.9  · Obesity, Class I, BMI 30-34.9     278.00/E66.9      Plan  · Orders  o Annual depression screening, 15 minutes (57701, ) - V79.0/Z13.89 - 05/29/2020  o ACO-18: Negative screen for clinical depression using a standardized tool () - V79.0/Z13.89 - 05/29/2020  o ACO-39: Current medications updated and reviewed () - - 05/29/2020  o ACO-14: Influenza immunization was not administered for reasons documented () - - 05/29/2020  · Medications  o phentermine 37.5 mg oral tablet   SIG: take 1 tablet (37.5 mg) by oral route once daily before breakfast   DISP: (30) tablets with 0 refills  Refilled on 05/29/2020     o Medications have been Reconciled  o Transition of Care or Provider Policy  · Instructions  o Depression Screen completed and scanned into the EMR under the designated folder within the patient's documents.  o Today's PHQ-9 result is ___4  o The provider screening met the required time of 15 minutes.  o Patient was educated/instructed on their diagnosis, treatment and medications prior to discharge from the clinic today.  o Time spent with the patient was minutes, more than 50% face to face.  o Electronically Identified Patient Education Materials Provided Electronically  · Disposition  o Call or Return if symptoms worsen or persist.  o Follow up in 1 month            Electronically Signed by: BRETT Orourke -Author on May 29, 2020 01:32:31 PM

## 2021-05-14 VITALS
RESPIRATION RATE: 18 BRPM | TEMPERATURE: 98.1 F | OXYGEN SATURATION: 97 % | HEART RATE: 82 BPM | DIASTOLIC BLOOD PRESSURE: 72 MMHG | BODY MASS INDEX: 27.82 KG/M2 | HEIGHT: 63 IN | WEIGHT: 157 LBS | SYSTOLIC BLOOD PRESSURE: 120 MMHG

## 2021-05-14 VITALS
HEART RATE: 63 BPM | SYSTOLIC BLOOD PRESSURE: 118 MMHG | OXYGEN SATURATION: 98 % | DIASTOLIC BLOOD PRESSURE: 74 MMHG | HEIGHT: 63 IN | BODY MASS INDEX: 26.75 KG/M2 | TEMPERATURE: 98.1 F | WEIGHT: 151 LBS

## 2021-05-14 NOTE — PROGRESS NOTES
Progress Note      Patient Name: Jeni De Santiago   Patient ID: [id number]   Sex: Female   YOB: 1985    Primary Care Provider: Rafael WEST    Visit Date: March 24, 2021    Provider: BRETT Orourke   Location: [facility]   Location Address: [address] [place] [address], [address]  [place], [place]  [id number]   Location Phone: [phone]          Chief Complaint  · Check up       History Of Present Illness  Jeni De Santiago is a 36 year old /White female who presents for evaluation and treatment of:      Presents to the office today for 6-month follow-up regarding her hypothyroidism and hypertension.  Patient's blood pressure is 118/74.  Patient has lost another 6 pounds since her last visit.  She states that she continues to exercise and eat healthy.  I explained to the patient that we would try to discontinue her lisinopril due to her blood pressure being normal and her losing weight.  I told her to keep checking her blood pressure twice a day and that if her pressures increased we would start back on the lisinopril.  I did review recent lab work with the patient.  TSH remains at 3.0.  I explained we would keep on her current dosage of levothyroxine.  Patient does complain of right bicep pain.  She states that this has been bothering her for approximately 3 months and she is unsure of any known injury or trauma.  Patient states that she has been going to the gym.  I did tell her not to be lifting weights till the pain resolves.  I did discuss putting her on anti-inflammatories to see if this would improve.  She denies any swelling ecchymosis or paresthesias       Past Medical History  Disease Name Date Onset Notes   Elevated blood pressure 12/13/2016 --    High blood pressure --  --    Snoring 12/13/2016 --    Weight gain 12/13/2016 --          Past Surgical History  Procedure Name Date Notes   *Denies any surgical procedures --  --          Medication List  Name Date  "Started Instructions   LEVOTHYROXINE SODIUM 100  Tablet 02/08/2021 TAKE ONE TABLET BY MOUTH EVERY DAY   lisinopril 10 mg oral tablet 10/19/2020 take 1 tablet (10 mg) by oral route once daily   Vitamin D3 50 mcg (2,000 unit) oral capsule 10/19/2020 take 1 capsule by oral route daily for 90 days   Wellbutrin  mg oral tablet extended release 24 hr 03/22/2021 take 1 tablet (150 mg) by oral route once daily         Allergy List  Allergen Name Date Reaction Notes   NO KNOWN DRUG ALLERGIES --  --  --          Family Medical History  Disease Name Relative/Age Notes   *No Known Family History  --          Social History  Finding Status Start/Stop Quantity Notes   Alcohol Never --/-- --  --    Tobacco Never --/-- --  --          Immunizations  NameDate Admin Mfg Trade Name Lot Number Route Inj VIS Given VIS Publication   InfluenzaRefused 04/27/2020 NE Not Entered  NE NE     Comments:          Review of Systems  · Constitutional  o Denies  o : fever, fatigue, weight loss, weight gain  · Cardiovascular  o Denies  o : lower extremity edema, claudication, chest pressure, palpitations  · Respiratory  o Denies  o : shortness of breath, wheezing, cough, hemoptysis, dyspnea on exertion  · Gastrointestinal  o Denies  o : nausea, vomiting, diarrhea, constipation, abdominal pain      Vitals  Date Time BP Position Site L\R Cuff Size HR RR TEMP (F) WT  HT  BMI kg/m2 BSA m2 O2 Sat FR L/min FiO2 HC       03/24/2021 03:02 /74 Sitting    63 - R  98.1 151lbs 0oz 5'  3\" 26.75 1.74 98 %            Physical Examination  · Constitutional  o Appearance  o : well-nourished, in no acute distress  · Eyes  o Conjunctivae  o : conjunctivae normal  o Sclerae  o : sclerae white  o Pupils and Irises  o : pupils equal and round  o Eyelids/Ocular Adnexae  o : eyelid appearance normal, no exudates present  · Ears, Nose, Mouth and Throat  o Ears  o :   § External Ears  § : external auditory canal appearance within normal limits, no discharge " present  § Otoscopic Examination  § : tympanic membrane appearance within normal limits bilaterally, cerumen not present  o Nose  o :   § External Nose  § : appearance normal  § Intranasal Exam  § : mucosa within normal limits, vestibules normal, no intranasal lesions present, septum midline, sinuses non tender to palpation  § Nasopharynx  § : no discharge present  o Oral Cavity  o :   § Oral Mucosa  § : oral mucosa normal  § Lips  § : lip appearance normal  § Teeth  § : normal dentation for age  o Throat  o :   § Oropharynx  § : no inflammation or lesions present, tonsils within normal limits  · Neck  o Inspection/Palpation  o : normal appearance, no masses or tenderness, trachea midline  o Range of Motion  o : cervical range of motion within normal limits  o Thyroid  o : gland size normal, nontender, no nodules or masses present on palpation  · Respiratory  o Respiratory Effort  o : breathing unlabored  o Inspection of Chest  o : normal appearance  o Auscultation of Lungs  o : normal breath sounds throughout inspiration and expiration  · Cardiovascular  o Heart  o :   § Auscultation of Heart  § : regular rate and rhythm, no murmurs, gallops or rubs  o Peripheral Vascular System  o :   § Extremities  § : no clubbing or edema  · Musculoskeletal  o Spine  o :   § Inspection/Palpation  § : no spinal tenderness, scoliosis or kyphosis present  § Range of Motion  § : spine range of motion normal  o Right Upper Extremity  o :   § Inspection/Palpation  § : Tenderness noted to the right bicep. Positive radial pulses. Cap refill less than 3 seconds pink warm and dry no edema or ecchymosis noted  o Left Upper Extremity  o :   § Inspection/Palpation  § : no tenderness to palpation, ROM normal  o Right Lower Extremity  o :   § Inspection/Palpation  § : no joint or limb tenderness to palpation, ROM normal  o Left Lower Extremity  o :   § Inspection/Palpation  § : no joint or limb tenderness to palpation, ROM normal  · Skin and  Subcutaneous Tissue  o General Inspection  o : no rashes or lesions present, no areas of discoloration  o Body Hair  o : hair normal for age, general body hair distribution normal for age  o Digits and Nails  o : no clubbing, cyanosis, deformities or edema present, normal appearing nails  · Neurologic  o Mental Status Examination  o :   § Orientation  § : grossly oriented to person, place and time  o Gait and Station  o : normal gait, able to stand without difficulty  · Psychiatric  o Judgement and Insight  o : judgment and insight intact  o Mood and Affect  o : mood normal, affect appropriate  o Presence of Abnormal Thoughts  o : no hallucinations, no delusions present, no psychotic thoughts          Assessment  · Essential hypertension     401.9/I10  · Hypothyroidism     244.9/E03.9  · Arm pain, right     729.5/M79.601      Plan  · Orders  o ACO-39: Current medications updated and reviewed (1159F, ) - - 03/24/2021  · Medications  o diclofenac sodium 75 mg oral tablet,delayed release (DR/EC)   SIG: take 1 tablet (75 mg) by oral route 2 times per day   DISP: (60) Tablet with 1 refills  Prescribed on 03/24/2021     o lisinopril 10 mg oral tablet   SIG: take 1 tablet (10 mg) by oral route once daily   DISP: (30) Tablet with 5 refills  Discontinued on 03/24/2021     o Medications have been Reconciled  o Transition of Care or Provider Policy  · Instructions  o Patient advised to monitor blood pressure (B/P) at home and journal readings. Patient informed that a B/P reading at home of more than 130/80 is considered hypertension. For readings greater [id number]/90 or higher patient is advised to follow up in the office with readings for management. Patient advised to limit sodium intake.  o Patient was educated/instructed on their diagnosis, treatment and medications prior to discharge from the clinic today.  o Minutes spent with patient including greater than 50% in Education/Counseling/Care Coordination.  o Time spent  with the patient was minutes, more than 50% face to face.  · Disposition  o Call or Return if symptoms worsen or persist.  o follow up in 6 months  o Care Transition            Electronically Signed by: BRETT Orourke -Author on March 24, 2021 03:28:22 PM

## 2021-05-15 VITALS
BODY MASS INDEX: 31.89 KG/M2 | OXYGEN SATURATION: 98 % | HEART RATE: 94 BPM | WEIGHT: 180 LBS | DIASTOLIC BLOOD PRESSURE: 70 MMHG | RESPIRATION RATE: 18 BRPM | SYSTOLIC BLOOD PRESSURE: 122 MMHG | HEIGHT: 63 IN | TEMPERATURE: 98.9 F

## 2021-05-15 VITALS
BODY MASS INDEX: 32.78 KG/M2 | RESPIRATION RATE: 16 BRPM | OXYGEN SATURATION: 100 % | HEIGHT: 63 IN | SYSTOLIC BLOOD PRESSURE: 118 MMHG | WEIGHT: 185 LBS | HEART RATE: 93 BPM | DIASTOLIC BLOOD PRESSURE: 74 MMHG | TEMPERATURE: 99 F

## 2021-05-15 VITALS
HEIGHT: 63 IN | TEMPERATURE: 98.5 F | BODY MASS INDEX: 29.23 KG/M2 | WEIGHT: 165 LBS | DIASTOLIC BLOOD PRESSURE: 78 MMHG | HEART RATE: 102 BPM | OXYGEN SATURATION: 100 % | SYSTOLIC BLOOD PRESSURE: 116 MMHG | RESPIRATION RATE: 16 BRPM

## 2021-05-15 VITALS
RESPIRATION RATE: 16 BRPM | TEMPERATURE: 99 F | HEART RATE: 104 BPM | BODY MASS INDEX: 30.12 KG/M2 | HEIGHT: 63 IN | SYSTOLIC BLOOD PRESSURE: 110 MMHG | OXYGEN SATURATION: 95 % | WEIGHT: 170 LBS | DIASTOLIC BLOOD PRESSURE: 76 MMHG

## 2021-05-15 VITALS
RESPIRATION RATE: 18 BRPM | TEMPERATURE: 98.7 F | HEART RATE: 82 BPM | DIASTOLIC BLOOD PRESSURE: 76 MMHG | WEIGHT: 176 LBS | HEIGHT: 63 IN | OXYGEN SATURATION: 100 % | SYSTOLIC BLOOD PRESSURE: 107 MMHG | BODY MASS INDEX: 31.18 KG/M2

## 2021-05-15 VITALS
SYSTOLIC BLOOD PRESSURE: 120 MMHG | HEIGHT: 63 IN | TEMPERATURE: 97.8 F | HEART RATE: 102 BPM | RESPIRATION RATE: 18 BRPM | OXYGEN SATURATION: 97 % | DIASTOLIC BLOOD PRESSURE: 72 MMHG | WEIGHT: 158 LBS | BODY MASS INDEX: 28 KG/M2

## 2021-05-16 VITALS
BODY MASS INDEX: 33.84 KG/M2 | OXYGEN SATURATION: 99 % | HEART RATE: 88 BPM | HEIGHT: 63 IN | RESPIRATION RATE: 16 BRPM | TEMPERATURE: 97.9 F | WEIGHT: 191 LBS | SYSTOLIC BLOOD PRESSURE: 117 MMHG | DIASTOLIC BLOOD PRESSURE: 78 MMHG

## 2021-05-16 VITALS
WEIGHT: 196.31 LBS | BODY MASS INDEX: 34.78 KG/M2 | HEIGHT: 63 IN | DIASTOLIC BLOOD PRESSURE: 87 MMHG | SYSTOLIC BLOOD PRESSURE: 134 MMHG | OXYGEN SATURATION: 96 % | RESPIRATION RATE: 16 BRPM | TEMPERATURE: 98.2 F | HEART RATE: 114 BPM

## 2021-05-16 VITALS
HEIGHT: 63 IN | TEMPERATURE: 98.5 F | OXYGEN SATURATION: 100 % | SYSTOLIC BLOOD PRESSURE: 125 MMHG | BODY MASS INDEX: 34.73 KG/M2 | RESPIRATION RATE: 16 BRPM | WEIGHT: 196 LBS | HEART RATE: 95 BPM | DIASTOLIC BLOOD PRESSURE: 80 MMHG

## 2021-07-01 RX ORDER — BUPROPION HYDROCHLORIDE 150 MG/1
TABLET ORAL
COMMUNITY
Start: 2021-05-27 | End: 2021-07-01 | Stop reason: SDUPTHER

## 2021-07-01 RX ORDER — BUPROPION HYDROCHLORIDE 150 MG/1
150 TABLET ORAL EVERY MORNING
Qty: 90 TABLET | Refills: 1 | Status: SHIPPED | OUTPATIENT
Start: 2021-07-01 | End: 2022-01-10 | Stop reason: SDUPTHER

## 2021-08-09 RX ORDER — LEVOTHYROXINE SODIUM 0.1 MG/1
100 TABLET ORAL DAILY
COMMUNITY
Start: 2021-05-13 | End: 2021-08-09 | Stop reason: SDUPTHER

## 2021-08-09 RX ORDER — LEVOTHYROXINE SODIUM 0.1 MG/1
100 TABLET ORAL DAILY
Qty: 90 TABLET | Refills: 1 | Status: SHIPPED | OUTPATIENT
Start: 2021-08-09 | End: 2022-02-08 | Stop reason: SDUPTHER

## 2021-08-09 NOTE — TELEPHONE ENCOUNTER
Caller: Jeni De Santiago    Relationship: Self    Best call back number: [phone]  Medication needed:   LEVOTHYROXINE, 100 MG. 1 A DAY     When do you need the refill by: ASAP      Does the patient have less than a 3 day supply:  [x] Yes  [] No    What is the patient's preferred [facility] [place] [facility] [address] [place] [address], [facility] - [phone]  - [phone] FX

## 2021-10-29 ENCOUNTER — OFFICE VISIT (OUTPATIENT)
Dept: FAMILY MEDICINE CLINIC | Facility: CLINIC | Age: 36
End: 2021-10-29

## 2021-10-29 VITALS
HEART RATE: 56 BPM | WEIGHT: 151.4 LBS | SYSTOLIC BLOOD PRESSURE: 110 MMHG | DIASTOLIC BLOOD PRESSURE: 72 MMHG | OXYGEN SATURATION: 92 % | BODY MASS INDEX: 26.82 KG/M2 | TEMPERATURE: 97.8 F | HEIGHT: 63 IN

## 2021-10-29 DIAGNOSIS — D22.9 NEVUS: ICD-10-CM

## 2021-10-29 DIAGNOSIS — Z51.81 MEDICATION MONITORING ENCOUNTER: ICD-10-CM

## 2021-10-29 DIAGNOSIS — E03.9 HYPOTHYROIDISM, UNSPECIFIED TYPE: ICD-10-CM

## 2021-10-29 DIAGNOSIS — B07.0 PLANTAR WART OF RIGHT FOOT: Primary | ICD-10-CM

## 2021-10-29 DIAGNOSIS — Z13.220 SCREENING FOR LIPID DISORDERS: ICD-10-CM

## 2021-10-29 PROBLEM — I10 HIGH BLOOD PRESSURE: Status: ACTIVE | Noted: 2021-10-29

## 2021-10-29 PROCEDURE — 11104 PUNCH BX SKIN SINGLE LESION: CPT | Performed by: NURSE PRACTITIONER

## 2021-10-29 PROCEDURE — 88305 TISSUE EXAM BY PATHOLOGIST: CPT | Performed by: NURSE PRACTITIONER

## 2021-10-29 PROCEDURE — 99214 OFFICE O/P EST MOD 30 MIN: CPT | Performed by: NURSE PRACTITIONER

## 2021-10-29 RX ORDER — ACETAMINOPHEN 160 MG
TABLET,DISINTEGRATING ORAL
COMMUNITY
Start: 2021-10-08 | End: 2022-01-10 | Stop reason: SDUPTHER

## 2021-10-29 NOTE — PROGRESS NOTES
"Chief Complaint  Foot Pain    Subjective          Jeni De Santiago presents to [facility]  Patient presents to the office today for follow-up regarding her hypothyroidism.  Patient also states that she has a skin lesion on her thoracic back that she would like to have evaluated and possibly removed.  Denies any pain or changes to the lesion.  She also states that she has a spot on the plantar side of her foot that is causing pain.      Objective   Vital Signs:   /72 (BP Location: Right arm, Patient Position: Sitting, Cuff Size: Adult)   Pulse 56   Temp 97.8 °F (36.6 °C) (Temporal)   Ht 160 cm (63\")   Wt 68.7 kg (151 lb 6.4 oz)   SpO2 92%   BMI 26.82 kg/m²     Physical Exam  Vitals reviewed.   Constitutional:       Appearance: Normal appearance.   Cardiovascular:      Rate and Rhythm: Normal rate and regular rhythm.      Pulses:           Dorsalis pedis pulses are 2+ on the right side.        Posterior tibial pulses are 2+ on the right side.      Heart sounds: Normal heart sounds, S1 normal and S2 normal. No murmur heard.      Pulmonary:      Effort: Pulmonary effort is normal. No respiratory distress.      Breath sounds: Normal breath sounds.   Musculoskeletal:        Feet:    Skin:     General: Skin is warm and dry.      Findings: Lesion present.      Comments: 5mm nevus noted to left thoracic back    Neurological:      Mental Status: She is alert and oriented to person, place, and time.   Psychiatric:         Attention and Perception: Attention normal.         Mood and Affect: Mood normal.         Behavior: Behavior normal.        Result Review :         Biopsy    Date/Time: 10/29/2021 11:47 AM  Performed by: Rafael Huang APRN  Authorized by: Rafael Huang APRN     Procedure Details - Skin Biopsy:     Body area: trunk    Trunk location: back    Initial size (mm): 5    Final defect size (mm): 6    Destruction method: punch biopsy      Comments:   Procedure explained to " the patient including risk and benefits.  Consent obtained.  Timeout completed including the right patient right site right procedure.  Area cleansed with Betadine using aseptic technique.  Area anesthetized using 1% lidocaine without epinephrine.  I did a 6 mm punch biopsy used to excise the skin then removed with a #11 scalpel.  Specimen placed in formalin.  Area cauterized using silver nitrate.  No active bleeding at this time minimal blood loss noted.  Patient tolerated procedure well.  Dressing applied.  Patient instructed to keep clean and dry.          Assessment and Plan    Diagnoses and all orders for this visit:    1. Plantar wart of right foot (Primary)  -     Ambulatory Referral to Podiatry    2. Nevus  -     Skin / Nail Biopsy  -     Biopsy    3. Screening for lipid disorders  -     Lipid Panel; Future    4. Hypothyroidism, unspecified type  -     Comprehensive Metabolic Panel; Future  -     TSH+Free T4; Future    5. Medication monitoring encounter  -     Comprehensive Metabolic Panel; Future  -     TSH+Free T4; Future        Follow Up   Return in about 4 months (around 2/28/2022) for Recheck.  Patient was given instructions and counseling regarding her condition or for health maintenance advice. Please see specific information pulled into the AVS if appropriate.

## 2021-11-02 LAB
CYTO UR: NORMAL
LAB AP CASE REPORT: NORMAL
LAB AP CLINICAL INFORMATION: NORMAL
PATH REPORT.FINAL DX SPEC: NORMAL
PATH REPORT.GROSS SPEC: NORMAL

## 2021-11-29 ENCOUNTER — OFFICE VISIT (OUTPATIENT)
Dept: PODIATRY | Facility: CLINIC | Age: 36
End: 2021-11-29

## 2021-11-29 VITALS
HEIGHT: 63 IN | BODY MASS INDEX: 27.46 KG/M2 | TEMPERATURE: 97.6 F | SYSTOLIC BLOOD PRESSURE: 156 MMHG | OXYGEN SATURATION: 100 % | WEIGHT: 155 LBS | HEART RATE: 96 BPM | DIASTOLIC BLOOD PRESSURE: 83 MMHG

## 2021-11-29 DIAGNOSIS — B07.0 PLANTAR WARTS: ICD-10-CM

## 2021-11-29 DIAGNOSIS — M79.671 FOOT PAIN, RIGHT: Primary | ICD-10-CM

## 2021-11-29 PROCEDURE — 99203 OFFICE O/P NEW LOW 30 MIN: CPT | Performed by: PODIATRIST

## 2021-11-29 PROCEDURE — 17110 DESTRUCTION B9 LES UP TO 14: CPT | Performed by: PODIATRIST

## 2021-11-29 NOTE — PROGRESS NOTES
Psychiatric - PODIATRY    Today's Date: 21    Patient Name: Jeni De Santiago  MRN: [id number]  CSN: [id number]  PCP: Rafael Huang APRN last PCP visit: 2021  Referring Provider: Rafael Huang APRN    SUBJECTIVE     Chief Complaint   Patient presents with   • Right Foot - Plantar Warts     HPI: Jeni De Santiago, a 36 y.o.female, presents to clinic.    New, Established, New Problem: New    Location: Plantar right heel    Duration: 2020    Onset: Insidious    Nature: Sore, achy    Stable, worsening, improving: Worsening    Aggravating factors:  Patient relates pain is aggravated by shoe gear and ambulation.      Previous Treatment: Over-the-counter wart pads    Patient denies any fevers, chills, nausea, vomiting, shortness of breath, nor any other constitutional signs nor symptoms.    No other pedal complaints at this time.    Recent medical changes: None    Past Medical History:   Diagnosis Date   • Hypertension    • Thyroid disease    • Verruca      Past Surgical History:   Procedure Laterality Date   •  SECTION       Family History   Problem Relation Age of Onset   • Hypertension Mother    • Hypertension Father      Social History     Socioeconomic History   • Marital status: Unknown   Tobacco Use   • Smoking status: Never Smoker   • Smokeless tobacco: Never Used   Vaping Use   • Vaping Use: Never used   Substance and Sexual Activity   • Alcohol use: Yes     Comment: very rare   • Drug use: Never   • Sexual activity: Defer     No Known Allergies  Current Outpatient Medications   Medication Sig Dispense Refill   • buPROPion XL (WELLBUTRIN XL) 150 MG 24 hr tablet Take 1 tablet by mouth Every Morning. 90 tablet 1   • Cholecalciferol (Vitamin D3) 50 MCG ( UT) capsule      • levothyroxine (SYNTHROID, LEVOTHROID) 100 MCG tablet Take 1 tablet by mouth Daily. 90 tablet 1     No current facility-administered medications for this visit.     Review of Systems   Constitutional:  Negative.    Skin:        Plantar plantar right heel   All other systems reviewed and are negative.      OBJECTIVE     Vitals:    11/29/21 1607   BP: 156/83   Pulse: 96   Temp: 97.6 °F (36.4 °C)   SpO2: 100%       Patient seen in no apparent distress.      PHYSICAL EXAM:     Foot/Ankle Exam:       General:   Appearance: appears stated age and healthy    Orientation: AAOx3    Affect: appropriate    Gait: unimpaired    Shoe Gear:  Casual shoes    VASCULAR      Right Foot Vascularity   Normal vascular exam    Dorsalis pedis:  2+  Posterior tibial:  2+  Skin Temperature: warm    Edema Grading:  None  CFT:  < 3 seconds  Pedal Hair Growth:  Present  Varicosities: none       Left Foot Vascularity   Normal vascular exam    Dorsalis pedis:  2+  Posterior tibial:  2+  Skin Temperature: warm    Edema Grading:  None  CFT:  < 3 seconds  Pedal Hair Growth:  Present  Varicosities: none        NEUROLOGIC     Right Foot Neurologic   Normal sensation    Light touch sensation:  Normal  Vibratory sensation:  Normal  Hot/Cold sensation: normal    Protective Sensation using [place] Monofilament:  10     Left Foot Neurologic   Normal sensation    Light touch sensation:  Normal  Vibratory sensation:  Normal  Hot/cold sensation: normal    Protective Sensation using [place] Monofilament:  10     MUSCLE STRENGTH     Right Foot Muscle Strength   Normal strength    Foot dorsiflexion:  5  Foot plantar flexion:  5  Foot inversion:  5  Foot eversion:  5     Left Foot Muscle Strength   Foot dorsiflexion:  5  Foot plantar flexion:  5  Foot inversion:  5  Foot eversion:  5     RANGE OF MOTION      Right Foot Range of Motion   Foot and ankle ROM within normal limits       Left Foot Range of Motion   Foot and ankle ROM within normal limits       DERMATOLOGIC     Right Foot Dermatologic   Skin: warts    Nails: normal       Left Foot Dermatologic   Skin: skin intact    Nails: normal        Right Foot Additional Comments Verruca lesion  areas were debrided with #15 scalpel blade down to pinpoint bleeding.  These areas are treated with applications of 89% phenol.  This was followed by irrigation with copious amounts of isopropyl alcohol.        ASSESSMENT/PLAN     Diagnoses and all orders for this visit:    1. Foot pain, right (Primary)    2. Plantar warts        Comprehensive lower extremity examination and evaluation was performed.    Options discussed included surgery, debridement, and office versus surgical suite procedures.    Discussed findings and treatment plan including risks, benefits, and treatment options with patient in detail. Patient agreed with treatment plan.    Patient instructed to apply over the counter salicylic pads to the lesion(s) area(s) at bedtime and remove during the day.  The patient states understanding and agreement with this plan.    An After Visit Summary was printed and given to the patient at discharge, including (if requested) any available informative/educational handouts regarding diagnosis, treatment, or medications. All questions were answered to patient/family satisfaction. Should symptoms fail to improve or worsen they agree to call or return to clinic or to go to the Emergency Department. Discussed the importance of following up with any needed screening tests/labs/specialist appointments and any requested follow-up recommended by me today. Importance of maintaining follow-up discussed and patient accepts that missed appointments can delay diagnosis and potentially lead to worsening of conditions.    Return in about 2 weeks (around 12/13/2021) for Plantar Verruca f/u., or sooner if acute issues arise.    This document has been electronically signed by Tor Murguia DPM on November 29, 2021 16:54 EST

## 2021-12-13 ENCOUNTER — OFFICE VISIT (OUTPATIENT)
Dept: PODIATRY | Facility: CLINIC | Age: 36
End: 2021-12-13

## 2021-12-13 VITALS
OXYGEN SATURATION: 100 % | SYSTOLIC BLOOD PRESSURE: 109 MMHG | DIASTOLIC BLOOD PRESSURE: 58 MMHG | HEART RATE: 116 BPM | WEIGHT: 158 LBS | TEMPERATURE: 97.1 F | BODY MASS INDEX: 28 KG/M2 | HEIGHT: 63 IN

## 2021-12-13 DIAGNOSIS — B07.0 PLANTAR WARTS: ICD-10-CM

## 2021-12-13 DIAGNOSIS — M79.671 FOOT PAIN, RIGHT: Primary | ICD-10-CM

## 2021-12-13 PROCEDURE — 17110 DESTRUCTION B9 LES UP TO 14: CPT | Performed by: PODIATRIST

## 2021-12-13 NOTE — PROGRESS NOTES
[facility] - PODIATRY    Today's Date: 21    Patient Name: Jeni De Santiago  MRN: [id number]  CSN: [id number]  PCP: Rafael Huang APRN last PCP visit: 2021  Referring Provider: No ref. provider found    SUBJECTIVE     Chief Complaint   Patient presents with   • Right Foot - Follow-up, Plantar Warts     HPI: Jeni De Santiago, a 36 y.o.female, presents to clinic.    New, Established, New Problem: Established    Location: Plantar right heel    Duration: 2020    Onset: Insidious    Nature: Sore, achy    Stable, worsening, improving: Proving    Aggravating factors:  Patient relates pain is aggravated by shoe gear and ambulation.      Previous Treatment: Over-the-counter wart pads, debridement with chemical treatment    Patient denies any fevers, chills, nausea, vomiting, shortness of breath, nor any other constitutional signs nor symptoms.    No other pedal complaints at this time.    Recent medical changes: None    Past Medical History:   Diagnosis Date   • Hypertension    • Thyroid disease    • Verruca      Past Surgical History:   Procedure Laterality Date   •  SECTION       Family History   Problem Relation Age of Onset   • Hypertension Mother    • Hypertension Father      Social History     Socioeconomic History   • Marital status: Unknown   Tobacco Use   • Smoking status: Never Smoker   • Smokeless tobacco: Never Used   Vaping Use   • Vaping Use: Never used   Substance and Sexual Activity   • Alcohol use: Yes     Comment: very rare   • Drug use: Never   • Sexual activity: Defer     No Known Allergies  Current Outpatient Medications   Medication Sig Dispense Refill   • buPROPion XL (WELLBUTRIN XL) 150 MG 24 hr tablet Take 1 tablet by mouth Every Morning. 90 tablet 1   • Cholecalciferol (Vitamin D3) 50 MCG (2000) capsule      • levothyroxine (SYNTHROID, LEVOTHROID) 100 MCG tablet Take 1 tablet by mouth Daily. 90 tablet 1     No current facility-administered medications  for this visit.     Review of Systems   Constitutional: Negative.    Skin:        Plantar plantar right heel   All other systems reviewed and are negative.      OBJECTIVE     Vitals:    12/13/21 0759   BP: 109/58   Pulse: 116   Temp: 97.1 °F (36.2 °C)   SpO2: 100%       Patient seen in no apparent distress.      PHYSICAL EXAM:     Foot/Ankle Exam:       General:   Appearance: appears stated age and healthy    Orientation: AAOx3    Affect: appropriate    Gait: unimpaired    Shoe Gear:  Casual shoes    VASCULAR      Right Foot Vascularity   Normal vascular exam    Dorsalis pedis:  2+  Posterior tibial:  2+  Skin Temperature: warm    Edema Grading:  None  CFT:  < 3 seconds  Pedal Hair Growth:  Present  Varicosities: none       Left Foot Vascularity   Normal vascular exam    Dorsalis pedis:  2+  Posterior tibial:  2+  Skin Temperature: warm    Edema Grading:  None  CFT:  < 3 seconds  Pedal Hair Growth:  Present  Varicosities: none        NEUROLOGIC     Right Foot Neurologic   Normal sensation    Light touch sensation:  Normal  Vibratory sensation:  Normal  Hot/Cold sensation: normal    Protective Sensation using [place] Monofilament:  10     Left Foot Neurologic   Normal sensation    Light touch sensation:  Normal  Vibratory sensation:  Normal  Hot/cold sensation: normal    Protective Sensation using [place] Monofilament:  10     MUSCLE STRENGTH     Right Foot Muscle Strength   Normal strength    Foot dorsiflexion:  5  Foot plantar flexion:  5  Foot inversion:  5  Foot eversion:  5     Left Foot Muscle Strength   Foot dorsiflexion:  5  Foot plantar flexion:  5  Foot inversion:  5  Foot eversion:  5     RANGE OF MOTION      Right Foot Range of Motion   Foot and ankle ROM within normal limits       Left Foot Range of Motion   Foot and ankle ROM within normal limits       DERMATOLOGIC     Right Foot Dermatologic   Skin: warts    Skin comment:  Smaller compared to previous visit.  Nails: normal       Left  Foot Dermatologic   Skin: skin intact    Nails: normal        Right Foot Additional Comments Verruca lesion areas were debrided with #15 scalpel blade down to pinpoint bleeding.  These areas are treated with applications of 89% phenol.  This was followed by irrigation with copious amounts of isopropyl alcohol.        ASSESSMENT/PLAN     Diagnoses and all orders for this visit:    1. Foot pain, right (Primary)    2. Plantar warts        Comprehensive lower extremity examination and evaluation was performed.    Options discussed included surgery, debridement, and office versus surgical suite procedures.    Discussed findings and treatment plan including risks, benefits, and treatment options with patient in detail. Patient agreed with treatment plan.    Patient instructed to apply over the counter salicylic pads to the lesion(s) area(s) at bedtime and remove during the day.  The patient states understanding and agreement with this plan.    An After Visit Summary was printed and given to the patient at discharge, including (if requested) any available informative/educational handouts regarding diagnosis, treatment, or medications. All questions were answered to patient/family satisfaction. Should symptoms fail to improve or worsen they agree to call or return to clinic or to go to the Emergency Department. Discussed the importance of following up with any needed screening tests/labs/specialist appointments and any requested follow-up recommended by me today. Importance of maintaining follow-up discussed and patient accepts that missed appointments can delay diagnosis and potentially lead to worsening of conditions.    Return in about 2 weeks (around 12/27/2021) for Plantar Verruca f/u., or sooner if acute issues arise.    This document has been electronically signed by Tor Murguia DPM on December 13, 2021 08:20 EST

## 2022-01-10 RX ORDER — BUPROPION HYDROCHLORIDE 150 MG/1
150 TABLET ORAL EVERY MORNING
Qty: 90 TABLET | Refills: 1 | Status: SHIPPED | OUTPATIENT
Start: 2022-01-10 | End: 2022-05-03 | Stop reason: ALTCHOICE

## 2022-01-10 RX ORDER — ACETAMINOPHEN 160 MG
2000 TABLET,DISINTEGRATING ORAL DAILY
Qty: 90 CAPSULE | Refills: 1 | Status: SHIPPED | OUTPATIENT
Start: 2022-01-10 | End: 2022-08-09 | Stop reason: SDUPTHER

## 2022-01-10 NOTE — TELEPHONE ENCOUNTER
Caller: Jonnathan Jeni Roxana    Relationship: Self    Best call back number: [phone]     Requested Prescriptions:   Requested Prescriptions     Pending Prescriptions Disp Refills   • buPROPion XL (WELLBUTRIN XL) 150 MG 24 hr tablet 90 tablet 1     Sig: Take 1 tablet by mouth Every Morning.   • Cholecalciferol (Vitamin D3) 50 MCG (2000 UT) capsule          Pharmacy where request should be sent: [place] [address] - [phone] [facility] [phone] FX       Does the patient have less than a 3 day supply:  [x] Yes  [] No    Abner Shields Rep   01/10/22 12:03 EST

## 2022-02-08 RX ORDER — LEVOTHYROXINE SODIUM 0.1 MG/1
100 TABLET ORAL DAILY
Qty: 90 TABLET | Refills: 1 | Status: SHIPPED | OUTPATIENT
Start: 2022-02-08 | End: 2022-05-03 | Stop reason: ALTCHOICE

## 2022-02-08 NOTE — TELEPHONE ENCOUNTER
Caller: Jeni De Santiago    Relationship: Self    Best call back number: [phone]    Requested Prescriptions:   Requested Prescriptions     Pending Prescriptions Disp Refills   • levothyroxine (SYNTHROID, LEVOTHROID) 100 MCG tablet 90 tablet 1     Sig: Take 1 tablet by mouth Daily.        Pharmacy where request should be sent: [address], [address] - [phone]  - [phone] FX     Additional details provided by patient: PATIENT IS OUT OF MEDICATIONS    Does the patient have less than a 3 day supply:  [x] Yes  [] No    Abner Mendoza Rep   02/08/22 12:11 EST

## 2022-03-21 ENCOUNTER — LAB (OUTPATIENT)
Dept: LAB | Facility: HOSPITAL | Age: 37
End: 2022-03-21

## 2022-03-21 DIAGNOSIS — Z13.220 SCREENING FOR LIPID DISORDERS: ICD-10-CM

## 2022-03-21 DIAGNOSIS — E03.9 HYPOTHYROIDISM, UNSPECIFIED TYPE: ICD-10-CM

## 2022-03-21 DIAGNOSIS — Z51.81 MEDICATION MONITORING ENCOUNTER: ICD-10-CM

## 2022-03-21 LAB
ALBUMIN SERPL-MCNC: 4.3 G/DL (ref 3.5–5.2)
ALBUMIN/GLOB SERPL: 1.4 G/DL
ALP SERPL-CCNC: 120 U/L (ref 39–117)
ALT SERPL W P-5'-P-CCNC: 13 U/L (ref 1–33)
ANION GAP SERPL CALCULATED.3IONS-SCNC: 10.6 MMOL/L (ref 5–15)
AST SERPL-CCNC: 17 U/L (ref 1–32)
BILIRUB SERPL-MCNC: 0.5 MG/DL (ref 0–1.2)
BUN SERPL-MCNC: 12 MG/DL (ref 6–20)
BUN/CREAT SERPL: 12.5 (ref 7–25)
CALCIUM SPEC-SCNC: 9 MG/DL (ref 8.6–10.5)
CHLORIDE SERPL-SCNC: 103 MMOL/L (ref 98–107)
CHOLEST SERPL-MCNC: 181 MG/DL (ref 0–200)
CO2 SERPL-SCNC: 25.4 MMOL/L (ref 22–29)
CREAT SERPL-MCNC: 0.96 MG/DL (ref 0.57–1)
EGFRCR SERPLBLD CKD-EPI 2021: 77.3 ML/MIN/1.73
GLOBULIN UR ELPH-MCNC: 3 GM/DL
GLUCOSE SERPL-MCNC: 80 MG/DL (ref 65–99)
HDLC SERPL-MCNC: 58 MG/DL (ref 40–60)
LDLC SERPL CALC-MCNC: 108 MG/DL (ref 0–100)
LDLC/HDLC SERPL: 1.83 {RATIO}
POTASSIUM SERPL-SCNC: 3.8 MMOL/L (ref 3.5–5.2)
PROT SERPL-MCNC: 7.3 G/DL (ref 6–8.5)
SODIUM SERPL-SCNC: 139 MMOL/L (ref 136–145)
T4 FREE SERPL-MCNC: 1.36 NG/DL (ref 0.93–1.7)
TRIGL SERPL-MCNC: 83 MG/DL (ref 0–150)
TSH SERPL DL<=0.05 MIU/L-ACNC: 16.2 UIU/ML (ref 0.27–4.2)
VLDLC SERPL-MCNC: 15 MG/DL (ref 5–40)

## 2022-03-21 PROCEDURE — 84443 ASSAY THYROID STIM HORMONE: CPT

## 2022-03-21 PROCEDURE — 80053 COMPREHEN METABOLIC PANEL: CPT

## 2022-03-21 PROCEDURE — 80061 LIPID PANEL: CPT

## 2022-03-21 PROCEDURE — 36415 COLL VENOUS BLD VENIPUNCTURE: CPT

## 2022-03-21 PROCEDURE — 84439 ASSAY OF FREE THYROXINE: CPT

## 2022-05-03 ENCOUNTER — OFFICE VISIT (OUTPATIENT)
Dept: FAMILY MEDICINE CLINIC | Facility: CLINIC | Age: 37
End: 2022-05-03

## 2022-05-03 VITALS
DIASTOLIC BLOOD PRESSURE: 68 MMHG | SYSTOLIC BLOOD PRESSURE: 118 MMHG | HEIGHT: 63 IN | TEMPERATURE: 98 F | BODY MASS INDEX: 27.93 KG/M2 | WEIGHT: 157.6 LBS | HEART RATE: 94 BPM | OXYGEN SATURATION: 100 %

## 2022-05-03 DIAGNOSIS — E03.9 HYPOTHYROIDISM, UNSPECIFIED TYPE: Primary | ICD-10-CM

## 2022-05-03 PROCEDURE — 99213 OFFICE O/P EST LOW 20 MIN: CPT | Performed by: NURSE PRACTITIONER

## 2022-05-03 RX ORDER — LEVOTHYROXINE SODIUM 112 UG/1
112 TABLET ORAL DAILY
Qty: 90 TABLET | Refills: 0 | Status: SHIPPED | OUTPATIENT
Start: 2022-05-03 | End: 2022-07-12 | Stop reason: SDUPTHER

## 2022-05-03 RX ORDER — BUPROPION HYDROCHLORIDE 150 MG/1
150 TABLET ORAL EVERY MORNING
Qty: 90 TABLET | Refills: 1 | Status: SHIPPED | OUTPATIENT
Start: 2022-05-03 | End: 2022-07-12 | Stop reason: SDUPTHER

## 2022-05-03 NOTE — PROGRESS NOTES
"Chief Complaint  Hypothyroidism (Follow up)    Subjective          Jeni De Santiago presents to [facility]  Follow-upPatient presents to the office today for rating her hypothyroidism.  I did review recent lab work with the patient including her elevated TSH.  I did explain that we would increase her levothyroxine to 112 mcg daily.  I also explained to the patient that I would place orders in 3 months for her to recheck her TSH.  Patient denies needing refills on any of her medications at this time.  Pressure arrival today is 118/68.  She denies any chest pain shortness breath palpitations this time      Objective   Vital Signs:   /68 (BP Location: Right arm, Patient Position: Sitting, Cuff Size: Adult)   Pulse 94   Temp 98 °F (36.7 °C) (Temporal)   Ht 160 cm (63\")   Wt 71.5 kg (157 lb 9.6 oz)   SpO2 100%   BMI 27.92 kg/m²     Physical Exam  Vitals reviewed.   Constitutional:       Appearance: Normal appearance.   Cardiovascular:      Rate and Rhythm: Normal rate and regular rhythm.      Pulses: Normal pulses.      Heart sounds: Normal heart sounds, S1 normal and S2 normal. No murmur heard.  Pulmonary:      Effort: Pulmonary effort is normal. No respiratory distress.      Breath sounds: Normal breath sounds.   Skin:     General: Skin is warm and dry.   Neurological:      Mental Status: She is alert and oriented to person, place, and time.   Psychiatric:         Attention and Perception: Attention normal.         Mood and Affect: Mood normal.         Behavior: Behavior normal.        Result Review :                 Assessment and Plan    Diagnoses and all orders for this visit:    1. Hypothyroidism, unspecified type (Primary)  -     levothyroxine (Synthroid) 112 MCG tablet; Take 1 tablet by mouth Daily.  Dispense: 90 tablet; Refill: 0  -     TSH; Future    Other orders  -     buPROPion XL (WELLBUTRIN XL) 150 MG 24 hr tablet; Take 1 tablet by mouth Every Morning.  Dispense: " 90 tablet; Refill: 1      BMI is >= 25 and < 30. (Overweight) The following options were offered after discussion: treating underlying condition         Follow Up   Return in about 1 year (around 5/3/2023) for Annual physical.  Patient was given instructions and counseling regarding her condition or for health maintenance advice. Please see specific information pulled into the AVS if appropriate.

## 2022-06-01 ENCOUNTER — OFFICE VISIT (OUTPATIENT)
Dept: FAMILY MEDICINE CLINIC | Facility: CLINIC | Age: 37
End: 2022-06-01

## 2022-06-01 VITALS
HEART RATE: 75 BPM | SYSTOLIC BLOOD PRESSURE: 116 MMHG | BODY MASS INDEX: 26.97 KG/M2 | WEIGHT: 152.2 LBS | OXYGEN SATURATION: 100 % | HEIGHT: 63 IN | DIASTOLIC BLOOD PRESSURE: 70 MMHG | TEMPERATURE: 97.5 F

## 2022-06-01 DIAGNOSIS — Z12.4 SCREENING FOR CERVICAL CANCER: Primary | ICD-10-CM

## 2022-06-01 PROCEDURE — 99214 OFFICE O/P EST MOD 30 MIN: CPT | Performed by: NURSE PRACTITIONER

## 2022-06-01 PROCEDURE — G0123 SCREEN CERV/VAG THIN LAYER: HCPCS | Performed by: NURSE PRACTITIONER

## 2022-06-01 NOTE — PROGRESS NOTES
"Chief Complaint  Gynecologic Exam    Subjective        Jeni De Santiago presents to [facility]  Patient presents to the office today for annual Pap smear.  Denies any concerns or complaints at this time.  Patient denies ever having a abnormal Pap smear in the past.      Objective   Vital Signs:  /70 (BP Location: Right arm, Patient Position: Sitting, Cuff Size: Adult)   Pulse 75   Temp 97.5 °F (36.4 °C) (Temporal)   Ht 160 cm (63\")   Wt 69 kg (152 lb 3.2 oz)   SpO2 100%   BMI 26.96 kg/m²     BMI is >= 25 and < 30. (Overweight) The following options were offered after discussion: nutrition counseling/recommendations      Physical Exam  Vitals reviewed. Exam conducted with a chaperone present.   Constitutional:       Appearance: Normal appearance.   Cardiovascular:      Rate and Rhythm: Normal rate and regular rhythm.      Heart sounds: Normal heart sounds, S1 normal and S2 normal. No murmur heard.  Pulmonary:      Effort: Pulmonary effort is normal. No respiratory distress.      Breath sounds: Normal breath sounds.   Chest:   Breasts:      Right: Normal. No mass, nipple discharge, skin change or tenderness.      Left: Normal. No mass, nipple discharge, skin change or tenderness.       Genitourinary:     General: Normal vulva.      Vagina: Normal.      Cervix: Normal.      Uterus: Normal.       Adnexa: Right adnexa normal and left adnexa normal.   Skin:     General: Skin is warm and dry.   Neurological:      Mental Status: She is alert and oriented to person, place, and time.   Psychiatric:         Attention and Perception: Attention normal.         Mood and Affect: Mood normal.         Behavior: Behavior normal.        Result Review :             Assessment and Plan   Diagnoses and all orders for this visit:    1. Screening for cervical cancer (Primary)  -     IGP,rfx Aptima HPV All Pth; Future             Follow Up   Return in about 1 year (around 6/1/2023) for Annual " physical.  Patient was given instructions and counseling regarding her condition or for health maintenance advice. Please see specific information pulled into the AVS if appropriate.

## 2022-06-06 LAB
CONV .: NORMAL
CYTOLOGIST CVX/VAG CYTO: NORMAL
CYTOLOGY CVX/VAG DOC CYTO: NORMAL
CYTOLOGY CVX/VAG DOC THIN PREP: NORMAL
DX ICD CODE: NORMAL
HIV 1 & 2 AB SER-IMP: NORMAL
Lab: NORMAL
OTHER STN SPEC: NORMAL
STAT OF ADQ CVX/VAG CYTO-IMP: NORMAL

## 2022-07-12 ENCOUNTER — TELEPHONE (OUTPATIENT)
Dept: FAMILY MEDICINE CLINIC | Facility: CLINIC | Age: 37
End: 2022-07-12

## 2022-07-12 DIAGNOSIS — E03.9 HYPOTHYROIDISM, UNSPECIFIED TYPE: ICD-10-CM

## 2022-07-12 RX ORDER — LEVOTHYROXINE SODIUM 112 UG/1
112 TABLET ORAL DAILY
Qty: 90 TABLET | Refills: 0 | Status: SHIPPED | OUTPATIENT
Start: 2022-07-12 | End: 2022-07-28 | Stop reason: SDUPTHER

## 2022-07-12 RX ORDER — BUPROPION HYDROCHLORIDE 150 MG/1
150 TABLET ORAL EVERY MORNING
Qty: 90 TABLET | Refills: 1 | Status: SHIPPED | OUTPATIENT
Start: 2022-07-12

## 2022-07-12 NOTE — TELEPHONE ENCOUNTER
Caller: Jein De Santiago    Relationship: Self    Best call back number:[phone].    Requested Prescriptions:   Requested Prescriptions     Pending Prescriptions Disp Refills   • buPROPion XL (WELLBUTRIN XL) 150 MG 24 hr tablet 90 tablet 1     Sig: Take 1 tablet by mouth Every Morning.   • levothyroxine (Synthroid) 112 MCG tablet 90 tablet 0     Sig: Take 1 tablet by mouth Daily.        Pharmacy where request should be sent: [address], [facility] - [phone] [facility] [phone] FX       Does the patient have less than a 3 day supply:  [] Yes  [x] No    Abner House   07/12/22 14:22 EDT

## 2022-07-28 DIAGNOSIS — E03.9 HYPOTHYROIDISM, UNSPECIFIED TYPE: ICD-10-CM

## 2022-07-28 RX ORDER — LEVOTHYROXINE SODIUM 112 UG/1
112 TABLET ORAL DAILY
Qty: 90 TABLET | Refills: 0 | Status: SHIPPED | OUTPATIENT
Start: 2022-07-28 | End: 2022-10-28 | Stop reason: SDUPTHER

## 2022-07-28 NOTE — TELEPHONE ENCOUNTER
Caller: Jeni De Santiago    Relationship: Self    Best call back number: [phone]  Requested Prescriptions:   Requested Prescriptions     Pending Prescriptions Disp Refills   • levothyroxine (Synthroid) 112 MCG tablet 90 tablet 0     Sig: Take 1 tablet by mouth Daily.        Pharmacy where request should be sent: [address], SUITE [facility] - [phone]  - [phone] FX     Does the patient have less than a 3 day supply:  [x] Yes  [] No    Abner DEL CID Rep   07/28/22 11:32 EDT

## 2022-08-09 RX ORDER — ACETAMINOPHEN 160 MG
2000 TABLET,DISINTEGRATING ORAL DAILY
Qty: 90 CAPSULE | Refills: 1 | Status: SHIPPED | OUTPATIENT
Start: 2022-08-09 | End: 2022-10-28 | Stop reason: SDUPTHER

## 2022-08-09 NOTE — TELEPHONE ENCOUNTER
Caller: Jonnathan Jeni Sue    Relationship: Self    Best call back number: [phone]    Requested Prescriptions:   Requested Prescriptions     Pending Prescriptions Disp Refills   • Cholecalciferol (Vitamin D3) 50 MCG (2000 UT) capsule 90 capsule 1     Sig: Take 1 capsule by mouth Daily.      Pharmacy where request should be sent: [address], [place] [address] - [phone]  - [phone] FX     Does the patient have less than a 3 day supply:  [x] Yes  [] No    Abner Zhang Rep   08/09/22 09:41 EDT

## 2022-08-17 ENCOUNTER — LAB (OUTPATIENT)
Dept: LAB | Facility: HOSPITAL | Age: 37
End: 2022-08-17

## 2022-08-17 DIAGNOSIS — E03.9 HYPOTHYROIDISM, UNSPECIFIED TYPE: ICD-10-CM

## 2022-08-17 LAB — TSH SERPL DL<=0.05 MIU/L-ACNC: 3.99 UIU/ML (ref 0.27–4.2)

## 2022-08-17 PROCEDURE — 36415 COLL VENOUS BLD VENIPUNCTURE: CPT

## 2022-08-17 PROCEDURE — 84443 ASSAY THYROID STIM HORMONE: CPT

## 2022-08-29 RX ORDER — ACETAMINOPHEN 160 MG
2000 TABLET,DISINTEGRATING ORAL DAILY
Qty: 90 CAPSULE | Refills: 1 | OUTPATIENT
Start: 2022-08-29

## 2022-10-28 ENCOUNTER — TELEPHONE (OUTPATIENT)
Dept: FAMILY MEDICINE CLINIC | Facility: CLINIC | Age: 37
End: 2022-10-28

## 2022-10-28 DIAGNOSIS — E03.9 HYPOTHYROIDISM, UNSPECIFIED TYPE: ICD-10-CM

## 2022-10-28 DIAGNOSIS — E55.9 VITAMIN D DEFICIENCY: Primary | ICD-10-CM

## 2022-10-28 RX ORDER — LEVOTHYROXINE SODIUM 112 UG/1
112 TABLET ORAL DAILY
Qty: 90 TABLET | Refills: 0 | Status: SHIPPED | OUTPATIENT
Start: 2022-10-28

## 2022-10-28 RX ORDER — ACETAMINOPHEN 160 MG
2000 TABLET,DISINTEGRATING ORAL DAILY
Qty: 90 CAPSULE | Refills: 1 | OUTPATIENT
Start: 2022-10-28

## 2022-10-28 RX ORDER — ACETAMINOPHEN 160 MG
2000 TABLET,DISINTEGRATING ORAL DAILY
Qty: 90 CAPSULE | Refills: 1 | Status: SHIPPED | OUTPATIENT
Start: 2022-10-28

## 2022-10-28 NOTE — TELEPHONE ENCOUNTER
Caller: Jeni De Santiago    Relationship: Self    Best call back number: [phone]    Requested Prescriptions:   Requested Prescriptions     Pending Prescriptions Disp Refills   • levothyroxine (Synthroid) 112 MCG tablet 90 tablet 0     Sig: Take 1 tablet by mouth Daily.   • Cholecalciferol (Vitamin D3) 50 MCG (2000 UT) capsule 90 capsule 1     Sig: Take 1 capsule by mouth Daily.        Pharmacy where request should be sent: [place] [address] - [phone] [facility] [phone] FX       Does the patient have less than a 3 day supply:  [x] Yes  [] No    Abner Serna Rep   10/28/22 10:59 EDT

## 2023-05-02 DIAGNOSIS — E03.9 HYPOTHYROIDISM, UNSPECIFIED TYPE: ICD-10-CM

## 2023-05-02 RX ORDER — LEVOTHYROXINE SODIUM 112 UG/1
112 TABLET ORAL DAILY
Qty: 30 TABLET | Refills: 0 | Status: SHIPPED | OUTPATIENT
Start: 2023-05-02 | End: 2023-05-03 | Stop reason: SDUPTHER

## 2023-05-03 ENCOUNTER — TELEPHONE (OUTPATIENT)
Dept: FAMILY MEDICINE CLINIC | Facility: CLINIC | Age: 38
End: 2023-05-03
Payer: MEDICAID

## 2023-05-03 DIAGNOSIS — E03.9 HYPOTHYROIDISM, UNSPECIFIED TYPE: ICD-10-CM

## 2023-05-03 RX ORDER — LEVOTHYROXINE SODIUM 112 UG/1
112 TABLET ORAL DAILY
Qty: 90 TABLET | Refills: 0 | Status: SHIPPED | OUTPATIENT
Start: 2023-05-03

## 2023-05-03 NOTE — TELEPHONE ENCOUNTER
Caller: Jeni De Santiago    Relationship: Self    Best call back number: [phone]    Requested Prescriptions:   Requested Prescriptions     Pending Prescriptions Disp Refills   • levothyroxine (SYNTHROID, LEVOTHROID) 112 MCG tablet 30 tablet 0     Sig: Take 1 tablet by mouth Daily.        Pharmacy where request should be sent: [address], SU[address] [facility] - [phone] [facility] [phone]      Last office visit with prescribing clinician: 6/1/2022   Last telemedicine visit with prescribing clinician: Visit date not found   Next office visit with prescribing clinician: Visit date not found     Does the patient have less than a 3 day supply:  [x] Yes  [] No    Would you like a call back once the refill request has been completed: [x] Yes [] No    If the office needs to give you a call back, can they leave a voicemail: [x] Yes [] No    Abner Le Rep   05/03/23 11:09 EDT

## 2023-05-31 RX ORDER — BUPROPION HYDROCHLORIDE 150 MG/1
TABLET ORAL
Qty: 30 TABLET | Refills: 1 | Status: SHIPPED | OUTPATIENT
Start: 2023-05-31

## 2023-06-02 ENCOUNTER — TELEPHONE (OUTPATIENT)
Dept: FAMILY MEDICINE CLINIC | Facility: CLINIC | Age: 38
End: 2023-06-02

## 2023-06-02 DIAGNOSIS — E03.9 HYPOTHYROIDISM, UNSPECIFIED TYPE: ICD-10-CM

## 2023-06-02 DIAGNOSIS — Z13.220 SCREENING FOR LIPID DISORDERS: ICD-10-CM

## 2023-06-02 DIAGNOSIS — E55.9 VITAMIN D DEFICIENCY: Primary | ICD-10-CM

## 2023-06-02 NOTE — TELEPHONE ENCOUNTER
Patient would like to know if she needs to get labs done before her appointment on 6/6/2023. Patient states she normally gets labs done one a year to check thyroid.

## 2023-06-05 ENCOUNTER — LAB (OUTPATIENT)
Dept: LAB | Facility: HOSPITAL | Age: 38
End: 2023-06-05
Payer: MEDICAID

## 2023-06-05 DIAGNOSIS — E55.9 VITAMIN D DEFICIENCY: ICD-10-CM

## 2023-06-05 DIAGNOSIS — E03.9 HYPOTHYROIDISM, UNSPECIFIED TYPE: ICD-10-CM

## 2023-06-05 DIAGNOSIS — Z13.220 SCREENING FOR LIPID DISORDERS: ICD-10-CM

## 2023-06-05 LAB
25(OH)D3 SERPL-MCNC: 51.4 NG/ML (ref 30–100)
ALBUMIN SERPL-MCNC: 4.1 G/DL (ref 3.5–5.2)
ALBUMIN/GLOB SERPL: 1.6 G/DL
ALP SERPL-CCNC: 85 U/L (ref 39–117)
ALT SERPL W P-5'-P-CCNC: 11 U/L (ref 1–33)
ANION GAP SERPL CALCULATED.3IONS-SCNC: 12.7 MMOL/L (ref 5–15)
AST SERPL-CCNC: 16 U/L (ref 1–32)
BILIRUB SERPL-MCNC: 0.3 MG/DL (ref 0–1.2)
BUN SERPL-MCNC: 12 MG/DL (ref 6–20)
BUN/CREAT SERPL: 13.3 (ref 7–25)
CALCIUM SPEC-SCNC: 9 MG/DL (ref 8.6–10.5)
CHLORIDE SERPL-SCNC: 105 MMOL/L (ref 98–107)
CHOLEST SERPL-MCNC: 170 MG/DL (ref 0–200)
CO2 SERPL-SCNC: 23.3 MMOL/L (ref 22–29)
CREAT SERPL-MCNC: 0.9 MG/DL (ref 0.57–1)
DEPRECATED RDW RBC AUTO: 41.4 FL (ref 37–54)
EGFRCR SERPLBLD CKD-EPI 2021: 83.1 ML/MIN/1.73
ERYTHROCYTE [DISTWIDTH] IN BLOOD BY AUTOMATED COUNT: 12.7 % (ref 12.3–15.4)
GLOBULIN UR ELPH-MCNC: 2.6 GM/DL
GLUCOSE SERPL-MCNC: 86 MG/DL (ref 65–99)
HCT VFR BLD AUTO: 38.4 % (ref 34–46.6)
HDLC SERPL-MCNC: 59 MG/DL (ref 40–60)
HGB BLD-MCNC: 13 G/DL (ref 12–15.9)
LDLC SERPL CALC-MCNC: 103 MG/DL (ref 0–100)
LDLC/HDLC SERPL: 1.75 {RATIO}
MCH RBC QN AUTO: 30.3 PG (ref 26.6–33)
MCHC RBC AUTO-ENTMCNC: 33.9 G/DL (ref 31.5–35.7)
MCV RBC AUTO: 89.5 FL (ref 79–97)
PLATELET # BLD AUTO: 308 10*3/MM3 (ref 140–450)
PMV BLD AUTO: 10 FL (ref 6–12)
POTASSIUM SERPL-SCNC: 4.4 MMOL/L (ref 3.5–5.2)
PROT SERPL-MCNC: 6.7 G/DL (ref 6–8.5)
RBC # BLD AUTO: 4.29 10*6/MM3 (ref 3.77–5.28)
SODIUM SERPL-SCNC: 141 MMOL/L (ref 136–145)
TRIGL SERPL-MCNC: 39 MG/DL (ref 0–150)
TSH SERPL DL<=0.05 MIU/L-ACNC: 0.89 UIU/ML (ref 0.27–4.2)
VLDLC SERPL-MCNC: 8 MG/DL (ref 5–40)
WBC NRBC COR # BLD: 5.87 10*3/MM3 (ref 3.4–10.8)

## 2023-06-05 PROCEDURE — 80050 GENERAL HEALTH PANEL: CPT

## 2023-06-05 PROCEDURE — 82306 VITAMIN D 25 HYDROXY: CPT

## 2023-06-05 PROCEDURE — 80061 LIPID PANEL: CPT

## 2023-06-05 PROCEDURE — 36415 COLL VENOUS BLD VENIPUNCTURE: CPT

## 2023-06-06 ENCOUNTER — OFFICE VISIT (OUTPATIENT)
Dept: FAMILY MEDICINE CLINIC | Facility: CLINIC | Age: 38
End: 2023-06-06
Payer: MEDICAID

## 2023-06-06 VITALS
HEIGHT: 63 IN | BODY MASS INDEX: 26.58 KG/M2 | SYSTOLIC BLOOD PRESSURE: 110 MMHG | TEMPERATURE: 97.8 F | DIASTOLIC BLOOD PRESSURE: 62 MMHG | HEART RATE: 99 BPM | OXYGEN SATURATION: 99 % | WEIGHT: 150 LBS

## 2023-06-06 DIAGNOSIS — E03.9 HYPOTHYROIDISM, UNSPECIFIED TYPE: ICD-10-CM

## 2023-06-06 DIAGNOSIS — Z00.00 ANNUAL PHYSICAL EXAM: ICD-10-CM

## 2023-06-06 DIAGNOSIS — Z12.31 ENCOUNTER FOR SCREENING MAMMOGRAM FOR MALIGNANT NEOPLASM OF BREAST: Primary | ICD-10-CM

## 2023-06-06 RX ORDER — CHOLECALCIFEROL (VITAMIN D3) 125 MCG
1 CAPSULE ORAL DAILY
COMMUNITY
Start: 2023-05-30

## 2023-06-06 RX ORDER — LEVOTHYROXINE SODIUM 112 UG/1
112 TABLET ORAL DAILY
Qty: 90 TABLET | Refills: 1 | Status: SHIPPED | OUTPATIENT
Start: 2023-06-06

## 2023-06-06 NOTE — PROGRESS NOTES
"Chief Complaint  Annual Exam    Subjective         Jeni De Santiago presents to [facility]  HPI     Objective     Vitals:    06/06/23 0830   BP: 110/62   BP Location: Right arm   Patient Position: Sitting   Cuff Size: Adult   Pulse: 99   Temp: 97.8 °F (36.6 °C)   TempSrc: Temporal   SpO2: 99%   Weight: 68 kg (150 lb)   Height: 160 cm (63\")      Body mass index is 26.57 kg/m².    BMI is >= 25 and <30. (Overweight) The following options were offered after discussion;: nutrition counseling/recommendations        Physical Exam  Vitals reviewed.   Constitutional:       Appearance: Normal appearance.   HENT:      Head: Normocephalic and atraumatic.      Right Ear: Tympanic membrane, ear canal and external ear normal.      Left Ear: Tympanic membrane, ear canal and external ear normal.      Nose: Nose normal.      Mouth/Throat:      Mouth: Mucous membranes are moist.      Pharynx: Oropharynx is clear.   Eyes:      Extraocular Movements: Extraocular movements intact.      Conjunctiva/sclera: Conjunctivae normal.      Pupils: Pupils are equal, round, and reactive to light.   Cardiovascular:      Rate and Rhythm: Normal rate and regular rhythm.      Pulses: Normal pulses.      Heart sounds: Normal heart sounds.   Pulmonary:      Effort: Pulmonary effort is normal.      Breath sounds: Normal breath sounds.   Abdominal:      General: Abdomen is flat. Bowel sounds are normal.      Palpations: Abdomen is soft.   Musculoskeletal:         General: Normal range of motion.      Cervical back: Normal range of motion and neck supple.   Skin:     General: Skin is warm and dry.   Neurological:      General: No focal deficit present.      Mental Status: She is alert and oriented to person, place, and time.   Psychiatric:         Mood and Affect: Mood normal.         Behavior: Behavior normal.        Result Review :   The following data was reviewed by: BRETT Orourke on " 06/06/2023:      Procedures    Assessment and Plan   Diagnoses and all orders for this visit:    1. Encounter for screening mammogram for malignant neoplasm of breast (Primary)  -     Mammo Screening Digital Tomosynthesis Bilateral With CAD; Future    2. Hypothyroidism, unspecified type  -     levothyroxine (SYNTHROID, LEVOTHROID) 112 MCG tablet; Take 1 tablet by mouth Daily.  Dispense: 90 tablet; Refill: 1    3. Annual physical exam      Preventative counseling includes healthy diet and exercise.  I also discussed breast cancer screening.  I did discuss colon cancer screening starting at age 45    Follow Up   Return in about 1 year (around 6/6/2024) for Annual physical.  Patient was given instructions and counseling regarding her condition or for health maintenance advice. Please see specific information pulled into the AVS if appropriate.

## 2023-08-02 DIAGNOSIS — E55.9 VITAMIN D DEFICIENCY: ICD-10-CM

## 2023-08-02 RX ORDER — BUPROPION HYDROCHLORIDE 150 MG/1
TABLET ORAL
Qty: 30 TABLET | Refills: 1 | Status: SHIPPED | OUTPATIENT
Start: 2023-08-02

## 2023-08-02 RX ORDER — CHOLECALCIFEROL (VITAMIN D3) 125 MCG
CAPSULE ORAL
Qty: 30 CAPSULE | Refills: 1 | Status: SHIPPED | OUTPATIENT
Start: 2023-08-02

## 2023-09-22 ENCOUNTER — HOSPITAL ENCOUNTER (OUTPATIENT)
Dept: MAMMOGRAPHY | Facility: HOSPITAL | Age: 38
Discharge: HOME | End: 2023-09-22
Admitting: NURSE PRACTITIONER
Payer: MEDICAID

## 2023-09-22 DIAGNOSIS — Z12.31 ENCOUNTER FOR SCREENING MAMMOGRAM FOR MALIGNANT NEOPLASM OF BREAST: ICD-10-CM

## 2023-09-22 PROCEDURE — 77067 SCR MAMMO BI INCL CAD: CPT

## 2023-09-22 PROCEDURE — 77063 BREAST TOMOSYNTHESIS BI: CPT

## 2023-09-25 DIAGNOSIS — E55.9 VITAMIN D DEFICIENCY: ICD-10-CM

## 2023-09-25 RX ORDER — BUPROPION HYDROCHLORIDE 150 MG/1
TABLET ORAL
Qty: 30 TABLET | Refills: 1 | Status: SHIPPED | OUTPATIENT
Start: 2023-09-25

## 2023-09-25 RX ORDER — ACETAMINOPHEN 160 MG
TABLET,DISINTEGRATING ORAL
Qty: 30 CAPSULE | Refills: 1 | Status: SHIPPED | OUTPATIENT
Start: 2023-09-25

## 2023-09-26 ENCOUNTER — TELEPHONE (OUTPATIENT)
Dept: FAMILY MEDICINE CLINIC | Facility: CLINIC | Age: 38
End: 2023-09-26
Payer: MEDICAID

## 2023-09-26 NOTE — TELEPHONE ENCOUNTER
"Informed patient of mammogram results     \"Benign mammogram. Suggest routine mammographic screening. \"    Patient voiced understanding.   "

## 2023-12-01 DIAGNOSIS — E55.9 VITAMIN D DEFICIENCY: ICD-10-CM

## 2023-12-04 RX ORDER — ACETAMINOPHEN 160 MG
TABLET,DISINTEGRATING ORAL
Qty: 30 CAPSULE | Refills: 1 | Status: SHIPPED | OUTPATIENT
Start: 2023-12-04

## 2023-12-04 RX ORDER — BUPROPION HYDROCHLORIDE 150 MG/1
TABLET ORAL
Qty: 30 TABLET | Refills: 1 | Status: SHIPPED | OUTPATIENT
Start: 2023-12-04

## 2024-02-05 DIAGNOSIS — E55.9 VITAMIN D DEFICIENCY: ICD-10-CM

## 2024-02-05 RX ORDER — ACETAMINOPHEN 160 MG
TABLET,DISINTEGRATING ORAL
Qty: 30 CAPSULE | Refills: 1 | Status: SHIPPED | OUTPATIENT
Start: 2024-02-05

## 2024-02-06 RX ORDER — BUPROPION HYDROCHLORIDE 150 MG/1
TABLET ORAL
Qty: 30 TABLET | Refills: 1 | Status: SHIPPED | OUTPATIENT
Start: 2024-02-06

## 2024-03-04 DIAGNOSIS — E03.9 HYPOTHYROIDISM, UNSPECIFIED TYPE: ICD-10-CM

## 2024-03-04 RX ORDER — LEVOTHYROXINE SODIUM 112 UG/1
112 TABLET ORAL DAILY
Qty: 30 TABLET | Refills: 1 | Status: SHIPPED | OUTPATIENT
Start: 2024-03-04

## 2024-04-01 DIAGNOSIS — E55.9 VITAMIN D DEFICIENCY: ICD-10-CM

## 2024-04-01 RX ORDER — BUPROPION HYDROCHLORIDE 150 MG/1
TABLET ORAL
Qty: 30 TABLET | Refills: 1 | Status: SHIPPED | OUTPATIENT
Start: 2024-04-01

## 2024-04-01 RX ORDER — ACETAMINOPHEN 160 MG
TABLET,DISINTEGRATING ORAL
Qty: 30 CAPSULE | Refills: 1 | Status: SHIPPED | OUTPATIENT
Start: 2024-04-01

## 2024-05-03 DIAGNOSIS — E03.9 HYPOTHYROIDISM, UNSPECIFIED TYPE: ICD-10-CM

## 2024-05-03 RX ORDER — LEVOTHYROXINE SODIUM 112 UG/1
112 TABLET ORAL DAILY
Qty: 30 TABLET | Refills: 1 | Status: SHIPPED | OUTPATIENT
Start: 2024-05-03

## 2024-05-29 DIAGNOSIS — E55.9 VITAMIN D DEFICIENCY: ICD-10-CM

## 2024-05-29 RX ORDER — ACETAMINOPHEN 160 MG
TABLET,DISINTEGRATING ORAL
Qty: 30 CAPSULE | Refills: 1 | Status: SHIPPED | OUTPATIENT
Start: 2024-05-29

## 2024-05-29 RX ORDER — BUPROPION HYDROCHLORIDE 150 MG/1
TABLET ORAL
Qty: 30 TABLET | Refills: 1 | Status: SHIPPED | OUTPATIENT
Start: 2024-05-29

## 2024-06-04 ENCOUNTER — LAB (OUTPATIENT)
Dept: LAB | Facility: HOSPITAL | Age: 39
End: 2024-06-04
Payer: MEDICAID

## 2024-06-04 DIAGNOSIS — Z00.00 WELL ADULT EXAM: ICD-10-CM

## 2024-06-04 DIAGNOSIS — E03.9 HYPOTHYROIDISM, UNSPECIFIED TYPE: Primary | ICD-10-CM

## 2024-06-04 DIAGNOSIS — E03.9 HYPOTHYROIDISM, UNSPECIFIED TYPE: ICD-10-CM

## 2024-06-04 LAB
ALBUMIN SERPL-MCNC: 4.3 G/DL (ref 3.5–5.2)
ALBUMIN/GLOB SERPL: 1.7 G/DL
ALP SERPL-CCNC: 67 U/L (ref 39–117)
ALT SERPL W P-5'-P-CCNC: 8 U/L (ref 1–33)
ANION GAP SERPL CALCULATED.3IONS-SCNC: 7.7 MMOL/L (ref 5–15)
AST SERPL-CCNC: 15 U/L (ref 1–32)
BILIRUB SERPL-MCNC: 0.4 MG/DL (ref 0–1.2)
BUN SERPL-MCNC: 13 MG/DL (ref 6–20)
BUN/CREAT SERPL: 17.8 (ref 7–25)
CALCIUM SPEC-SCNC: 8.8 MG/DL (ref 8.6–10.5)
CHLORIDE SERPL-SCNC: 106 MMOL/L (ref 98–107)
CHOLEST SERPL-MCNC: 194 MG/DL (ref 0–200)
CO2 SERPL-SCNC: 25.3 MMOL/L (ref 22–29)
CREAT SERPL-MCNC: 0.73 MG/DL (ref 0.57–1)
DEPRECATED RDW RBC AUTO: 42.7 FL (ref 37–54)
EGFRCR SERPLBLD CKD-EPI 2021: 106.1 ML/MIN/1.73
ERYTHROCYTE [DISTWIDTH] IN BLOOD BY AUTOMATED COUNT: 13.4 % (ref 12.3–15.4)
GLOBULIN UR ELPH-MCNC: 2.5 GM/DL
GLUCOSE SERPL-MCNC: 86 MG/DL (ref 65–99)
HCT VFR BLD AUTO: 36.9 % (ref 34–46.6)
HDLC SERPL-MCNC: 66 MG/DL (ref 40–60)
HGB BLD-MCNC: 12.3 G/DL (ref 12–15.9)
LDLC SERPL CALC-MCNC: 120 MG/DL (ref 0–100)
LDLC/HDLC SERPL: 1.82 {RATIO}
MCH RBC QN AUTO: 29.3 PG (ref 26.6–33)
MCHC RBC AUTO-ENTMCNC: 33.3 G/DL (ref 31.5–35.7)
MCV RBC AUTO: 87.9 FL (ref 79–97)
PLATELET # BLD AUTO: 311 10*3/MM3 (ref 140–450)
PMV BLD AUTO: 9.4 FL (ref 6–12)
POTASSIUM SERPL-SCNC: 3.9 MMOL/L (ref 3.5–5.2)
PROT SERPL-MCNC: 6.8 G/DL (ref 6–8.5)
RBC # BLD AUTO: 4.2 10*6/MM3 (ref 3.77–5.28)
SODIUM SERPL-SCNC: 139 MMOL/L (ref 136–145)
TRIGL SERPL-MCNC: 40 MG/DL (ref 0–150)
TSH SERPL DL<=0.05 MIU/L-ACNC: 1.6 UIU/ML (ref 0.27–4.2)
VLDLC SERPL-MCNC: 8 MG/DL (ref 5–40)
WBC NRBC COR # BLD AUTO: 5.26 10*3/MM3 (ref 3.4–10.8)

## 2024-06-04 PROCEDURE — 80053 COMPREHEN METABOLIC PANEL: CPT

## 2024-06-04 PROCEDURE — 84443 ASSAY THYROID STIM HORMONE: CPT

## 2024-06-04 PROCEDURE — 85027 COMPLETE CBC AUTOMATED: CPT

## 2024-06-04 PROCEDURE — 80061 LIPID PANEL: CPT

## 2024-06-04 PROCEDURE — 36415 COLL VENOUS BLD VENIPUNCTURE: CPT

## 2024-06-10 ENCOUNTER — OFFICE VISIT (OUTPATIENT)
Dept: FAMILY MEDICINE CLINIC | Facility: CLINIC | Age: 39
End: 2024-06-10
Payer: MEDICAID

## 2024-06-10 VITALS
HEIGHT: 63 IN | HEART RATE: 81 BPM | BODY MASS INDEX: 27.78 KG/M2 | TEMPERATURE: 97.9 F | WEIGHT: 156.8 LBS | DIASTOLIC BLOOD PRESSURE: 64 MMHG | SYSTOLIC BLOOD PRESSURE: 110 MMHG | OXYGEN SATURATION: 99 %

## 2024-06-10 DIAGNOSIS — Z12.31 ENCOUNTER FOR SCREENING MAMMOGRAM FOR MALIGNANT NEOPLASM OF BREAST: Primary | ICD-10-CM

## 2024-06-10 DIAGNOSIS — E03.9 HYPOTHYROIDISM, UNSPECIFIED TYPE: ICD-10-CM

## 2024-06-10 DIAGNOSIS — Z00.00 WELL ADULT EXAM: ICD-10-CM

## 2024-06-10 DIAGNOSIS — F41.9 ANXIETY: ICD-10-CM

## 2024-06-10 PROCEDURE — 3074F SYST BP LT 130 MM HG: CPT | Performed by: NURSE PRACTITIONER

## 2024-06-10 PROCEDURE — 99396 PREV VISIT EST AGE 40-64: CPT | Performed by: NURSE PRACTITIONER

## 2024-06-10 PROCEDURE — 3078F DIAST BP <80 MM HG: CPT | Performed by: NURSE PRACTITIONER

## 2024-06-10 PROCEDURE — 1160F RVW MEDS BY RX/DR IN RCRD: CPT | Performed by: NURSE PRACTITIONER

## 2024-06-10 PROCEDURE — 1159F MED LIST DOCD IN RCRD: CPT | Performed by: NURSE PRACTITIONER

## 2024-06-10 PROCEDURE — 1125F AMNT PAIN NOTED PAIN PRSNT: CPT | Performed by: NURSE PRACTITIONER

## 2024-06-10 RX ORDER — LEVOTHYROXINE SODIUM 112 UG/1
112 TABLET ORAL DAILY
Qty: 90 TABLET | Refills: 1 | Status: SHIPPED | OUTPATIENT
Start: 2024-06-10

## 2024-06-10 RX ORDER — BUPROPION HYDROCHLORIDE 150 MG/1
150 TABLET ORAL EVERY MORNING
Qty: 90 TABLET | Refills: 1 | Status: SHIPPED | OUTPATIENT
Start: 2024-06-10

## 2024-06-10 NOTE — PROGRESS NOTES
"Chief Complaint  Annual Exam    Subjective         Jeni De Santiago presents to [facility]  Patient presents to the office today for annual physical.  I did review recent lab work with the patient.  Patient blood pressure is 110/64.  She denies any chest pain shortness breath palpitations at this time.  Did explain she will be due for mammogram in September.  I explained that we would order this and she can schedule at her convenience.  She denies any other concerns or complaints at this time.  She denies any family history of colon cancer.  I did explain that she can start her colon cancer screenings at age 45.         Objective     Vitals:    06/10/24 0834   BP: 110/64   BP Location: Right arm   Patient Position: Sitting   Cuff Size: Adult   Pulse: 81   Temp: 97.9 °F (36.6 °C)   TempSrc: Temporal   SpO2: 99%   Weight: 71.1 kg (156 lb 12.8 oz)   Height: 160 cm (63\")      Body mass index is 27.78 kg/m².             Physical Exam  Vitals reviewed.   Constitutional:       Appearance: Normal appearance.   HENT:      Head: Normocephalic and atraumatic.      Right Ear: Tympanic membrane, ear canal and external ear normal.      Left Ear: Tympanic membrane, ear canal and external ear normal.      Nose: Nose normal.      Mouth/Throat:      Mouth: Mucous membranes are moist.      Pharynx: Oropharynx is clear.   Eyes:      Extraocular Movements: Extraocular movements intact.      Conjunctiva/sclera: Conjunctivae normal.      Pupils: Pupils are equal, round, and reactive to light.   Cardiovascular:      Rate and Rhythm: Normal rate and regular rhythm.      Pulses: Normal pulses.      Heart sounds: Normal heart sounds.   Pulmonary:      Effort: Pulmonary effort is normal.      Breath sounds: Normal breath sounds.   Abdominal:      General: Abdomen is flat. Bowel sounds are normal.      Palpations: Abdomen is soft.   Musculoskeletal:         General: Normal range of motion.      Cervical back: " Normal range of motion and neck supple.   Skin:     General: Skin is warm and dry.   Neurological:      General: No focal deficit present.      Mental Status: She is alert and oriented to person, place, and time.   Psychiatric:         Mood and Affect: Mood normal.         Behavior: Behavior normal.          Result Review :   The following data was reviewed by: BRETT Orourke on 06/10/2024:      Procedures    Assessment and Plan   Diagnoses and all orders for this visit:    1. Encounter for screening mammogram for malignant neoplasm of breast (Primary)  -     Mammo Screening Digital Tomosynthesis Bilateral With CAD; Future    2. Hypothyroidism, unspecified type  -     levothyroxine (SYNTHROID, LEVOTHROID) 112 MCG tablet; Take 1 tablet by mouth Daily.  Dispense: 90 tablet; Refill: 1    3. Well adult exam    4. Anxiety  -     buPROPion XL (WELLBUTRIN XL) 150 MG 24 hr tablet; Take 1 tablet by mouth Every Morning.  Dispense: 90 tablet; Refill: 1    Preventative counseling includes healthy diet and exercise      Follow Up   Return in about 1 year (around 6/10/2025) for Annual physical.  Patient was given instructions and counseling regarding her condition or for health maintenance advice. Please see specific information pulled into the AVS if appropriate.

## 2024-08-03 DIAGNOSIS — E55.9 VITAMIN D DEFICIENCY: ICD-10-CM

## 2024-08-05 RX ORDER — ACETAMINOPHEN 160 MG
TABLET,DISINTEGRATING ORAL
Qty: 30 CAPSULE | Refills: 1 | Status: SHIPPED | OUTPATIENT
Start: 2024-08-05

## 2024-09-28 DIAGNOSIS — E55.9 VITAMIN D DEFICIENCY: ICD-10-CM

## 2024-09-30 RX ORDER — ACETAMINOPHEN 160 MG
TABLET,DISINTEGRATING ORAL
Qty: 30 CAPSULE | Refills: 1 | Status: SHIPPED | OUTPATIENT
Start: 2024-09-30

## 2024-10-23 ENCOUNTER — OFFICE VISIT (OUTPATIENT)
Dept: FAMILY MEDICINE CLINIC | Facility: CLINIC | Age: 39
End: 2024-10-23
Payer: MEDICAID

## 2024-10-23 VITALS
OXYGEN SATURATION: 98 % | BODY MASS INDEX: 27.53 KG/M2 | HEIGHT: 62 IN | HEART RATE: 92 BPM | DIASTOLIC BLOOD PRESSURE: 80 MMHG | TEMPERATURE: 98.9 F | SYSTOLIC BLOOD PRESSURE: 130 MMHG | WEIGHT: 149.6 LBS | RESPIRATION RATE: 14 BRPM

## 2024-10-23 DIAGNOSIS — M26.609 TMJ DYSFUNCTION: Primary | ICD-10-CM

## 2024-10-23 PROCEDURE — 3079F DIAST BP 80-89 MM HG: CPT | Performed by: NURSE PRACTITIONER

## 2024-10-23 PROCEDURE — 1125F AMNT PAIN NOTED PAIN PRSNT: CPT | Performed by: NURSE PRACTITIONER

## 2024-10-23 PROCEDURE — 1160F RVW MEDS BY RX/DR IN RCRD: CPT | Performed by: NURSE PRACTITIONER

## 2024-10-23 PROCEDURE — 99213 OFFICE O/P EST LOW 20 MIN: CPT | Performed by: NURSE PRACTITIONER

## 2024-10-23 PROCEDURE — 1159F MED LIST DOCD IN RCRD: CPT | Performed by: NURSE PRACTITIONER

## 2024-10-23 PROCEDURE — 3075F SYST BP GE 130 - 139MM HG: CPT | Performed by: NURSE PRACTITIONER

## 2024-10-23 NOTE — PROGRESS NOTES
Chief Complaint  Jaw Pain (Swelling left side of jaw to ear)    Subjective        Jeni De Santiago presents to [facility]  History of Present Illness  Jaw pain:  shooting to left ear for a month.  If mouth closed no pain but if opens mouth, chews or moves tongue will elicit pain in TMJ region.   States did take 4 ibuprofen last night but didn't see that it helped back or jaw.  Had back pain from carrying mattress up steps.  States no dental pain or mouth pain just in TMJ.  Feels like it is clicking when opens mouth.  Denies grinding teeth or trauma.  Jaw Pain  Pertinent negatives include no chest pain, coughing, fever, numbness or weakness.       The following portions of the patient's history were personally reviewed and updated as appropriate: allergies, current medications, past medical history, past surgical history, past family history, and past social history.     Body mass index is 27.81 kg/m².           Past History:    Medical History: has a past medical history of Hypertension, Thyroid disease, and Verruca.     Surgical History: has a past surgical history that includes  section.     Family History: family history includes Hypertension in her father and mother.     Social History: reports that she has never smoked. She has been exposed to tobacco smoke. She has never used smokeless tobacco. She reports current alcohol use. She reports that she does not use drugs.    Allergies: Patient has no known allergies.          Current Outpatient Medications:     buPROPion XL (WELLBUTRIN XL) 150 MG 24 hr tablet, Take 1 tablet by mouth Every Morning., Disp: 90 tablet, Rfl: 1    Cholecalciferol (Vitamin D3) 50 MCG (2000 UT) capsule, TAKE ONE CAPSULE BY MOUTH EVERY DAY, Disp: 30 capsule, Rfl: 1    levothyroxine (SYNTHROID, LEVOTHROID) 112 MCG tablet, Take 1 tablet by mouth Daily., Disp: 90 tablet, Rfl: 1    diclofenac (VOLTAREN) 50 MG EC tablet, Take 1 tablet by mouth 2 (Two)  "Times a Day As Needed (pain)., Disp: 60 tablet, Rfl: 1    There are no discontinued medications.      Review of Systems   Constitutional:  Negative for fever.   Respiratory:  Negative for cough and shortness of breath.    Cardiovascular:  Negative for chest pain, palpitations and leg swelling.   Neurological:  Negative for dizziness, weakness, numbness and headache.        Objective         Vitals:    10/23/24 1258   BP: 130/80   BP Location: Right arm   Patient Position: Sitting   Cuff Size: Adult   Pulse: 92   Resp: 14   Temp: 98.9 °F (37.2 °C)   TempSrc: Temporal   SpO2: 98%   Weight: 67.9 kg (149 lb 9.6 oz)   Height: 156.2 cm (61.5\")     Body mass index is 27.81 kg/m².         Physical Exam  Vitals reviewed.   Constitutional:       Appearance: Normal appearance. She is well-developed.   HENT:      Head: Normocephalic and atraumatic.        Comments: Tenderness and clicking when opens and closes mouth.     Mouth/Throat:      Pharynx: No oropharyngeal exudate.   Eyes:      Conjunctiva/sclera: Conjunctivae normal.      Pupils: Pupils are equal, round, and reactive to light.   Cardiovascular:      Rate and Rhythm: Normal rate and regular rhythm.      Heart sounds: Normal heart sounds. No murmur heard.     No friction rub. No gallop.   Pulmonary:      Effort: Pulmonary effort is normal.      Breath sounds: Normal breath sounds. No wheezing or rhonchi.   Skin:     General: Skin is warm and dry.   Neurological:      Mental Status: She is alert and oriented to person, place, and time.   Psychiatric:         Mood and Affect: Mood and affect normal.         Behavior: Behavior normal.         Thought Content: Thought content normal.         Judgment: Judgment normal.             Result Review :               Assessment and Plan     Diagnoses and all orders for this visit:    1. TMJ dysfunction (Primary)  -     diclofenac (VOLTAREN) 50 MG EC tablet; Take 1 tablet by mouth 2 (Two) Times a Day As Needed (pain).  Dispense: 60 " tablet; Refill: 1      If doesn't help will get to TMJ specialist.        Follow Up     Return for Next scheduled follow up.    Patient was given instructions and counseling regarding her condition or for health maintenance advice. Please see specific information pulled into the AVS if appropriate.

## 2024-11-22 DIAGNOSIS — E55.9 VITAMIN D DEFICIENCY: ICD-10-CM

## 2024-11-22 RX ORDER — ACETAMINOPHEN 160 MG
TABLET,DISINTEGRATING ORAL
Qty: 30 CAPSULE | Refills: 1 | Status: SHIPPED | OUTPATIENT
Start: 2024-11-22

## 2024-12-17 DIAGNOSIS — E03.9 HYPOTHYROIDISM, UNSPECIFIED TYPE: ICD-10-CM

## 2024-12-17 DIAGNOSIS — M26.609 TMJ DYSFUNCTION: ICD-10-CM

## 2024-12-17 RX ORDER — LEVOTHYROXINE SODIUM 112 UG/1
112 TABLET ORAL DAILY
Qty: 30 TABLET | Refills: 1 | Status: SHIPPED | OUTPATIENT
Start: 2024-12-17

## 2025-01-24 DIAGNOSIS — E55.9 VITAMIN D DEFICIENCY: ICD-10-CM

## 2025-01-24 RX ORDER — ACETAMINOPHEN 160 MG
TABLET,DISINTEGRATING ORAL
Qty: 30 CAPSULE | Refills: 1 | Status: SHIPPED | OUTPATIENT
Start: 2025-01-24

## 2025-02-28 DIAGNOSIS — M26.609 TMJ DYSFUNCTION: ICD-10-CM

## 2025-02-28 DIAGNOSIS — E03.9 HYPOTHYROIDISM, UNSPECIFIED TYPE: ICD-10-CM

## 2025-02-28 DIAGNOSIS — F41.9 ANXIETY: ICD-10-CM

## 2025-02-28 RX ORDER — BUPROPION HYDROCHLORIDE 150 MG/1
150 TABLET ORAL EVERY MORNING
Qty: 90 TABLET | Refills: 1 | Status: SHIPPED | OUTPATIENT
Start: 2025-02-28

## 2025-02-28 RX ORDER — LEVOTHYROXINE SODIUM 112 UG/1
112 TABLET ORAL DAILY
Qty: 90 TABLET | Refills: 1 | Status: SHIPPED | OUTPATIENT
Start: 2025-02-28

## 2025-03-28 DIAGNOSIS — E55.9 VITAMIN D DEFICIENCY: ICD-10-CM

## 2025-03-28 RX ORDER — ACETAMINOPHEN 160 MG
2000 TABLET,DISINTEGRATING ORAL DAILY
Qty: 30 CAPSULE | Refills: 1 | Status: SHIPPED | OUTPATIENT
Start: 2025-03-28

## 2025-06-02 DIAGNOSIS — E55.9 VITAMIN D DEFICIENCY: ICD-10-CM

## 2025-06-02 RX ORDER — ACETAMINOPHEN 160 MG
2000 TABLET,DISINTEGRATING ORAL DAILY
Qty: 30 CAPSULE | Refills: 1 | Status: SHIPPED | OUTPATIENT
Start: 2025-06-02

## 2025-06-09 ENCOUNTER — TELEPHONE (OUTPATIENT)
Dept: FAMILY MEDICINE CLINIC | Facility: CLINIC | Age: 40
End: 2025-06-09
Payer: MEDICAID

## 2025-06-09 DIAGNOSIS — E55.9 VITAMIN D DEFICIENCY: Primary | ICD-10-CM

## 2025-06-09 DIAGNOSIS — Z00.00 WELL ADULT EXAM: ICD-10-CM

## 2025-06-09 DIAGNOSIS — E03.9 HYPOTHYROIDISM, UNSPECIFIED TYPE: ICD-10-CM

## 2025-06-09 NOTE — TELEPHONE ENCOUNTER
Caller: Jeni De Santiago    Relationship: Self    Best call back number:     [phone]      What orders are you requesting (i.e. lab or imaging): BLOOD WORK FOR PHYSICAL    In what timeframe would the patient need to come in: BEFORE APPOINTMENT ON 6.24.25    Where will you receive your lab/imaging services: [facility]    Additional notes:

## 2025-06-17 ENCOUNTER — LAB (OUTPATIENT)
Dept: LAB | Facility: HOSPITAL | Age: 40
End: 2025-06-17
Payer: MEDICAID

## 2025-06-17 DIAGNOSIS — E55.9 VITAMIN D DEFICIENCY: ICD-10-CM

## 2025-06-17 DIAGNOSIS — E03.9 HYPOTHYROIDISM, UNSPECIFIED TYPE: ICD-10-CM

## 2025-06-17 DIAGNOSIS — Z00.00 WELL ADULT EXAM: ICD-10-CM

## 2025-06-17 LAB
25(OH)D3 SERPL-MCNC: 55.4 NG/ML (ref 30–100)
ALBUMIN SERPL-MCNC: 3.9 G/DL (ref 3.5–5.2)
ALBUMIN/GLOB SERPL: 1.4 G/DL
ALP SERPL-CCNC: 110 U/L (ref 39–117)
ALT SERPL W P-5'-P-CCNC: 34 U/L (ref 1–33)
ANION GAP SERPL CALCULATED.3IONS-SCNC: 8.3 MMOL/L (ref 5–15)
AST SERPL-CCNC: 36 U/L (ref 1–32)
BILIRUB SERPL-MCNC: 0.3 MG/DL (ref 0–1.2)
BUN SERPL-MCNC: 10 MG/DL (ref 6–20)
BUN/CREAT SERPL: 12.7 (ref 7–25)
CALCIUM SPEC-SCNC: 9 MG/DL (ref 8.6–10.5)
CHLORIDE SERPL-SCNC: 104 MMOL/L (ref 98–107)
CHOLEST SERPL-MCNC: 202 MG/DL (ref 0–200)
CO2 SERPL-SCNC: 26.7 MMOL/L (ref 22–29)
CREAT SERPL-MCNC: 0.79 MG/DL (ref 0.57–1)
DEPRECATED RDW RBC AUTO: 41 FL (ref 37–54)
EGFRCR SERPLBLD CKD-EPI 2021: 95.9 ML/MIN/1.73
ERYTHROCYTE [DISTWIDTH] IN BLOOD BY AUTOMATED COUNT: 13.6 % (ref 12.3–15.4)
GLOBULIN UR ELPH-MCNC: 2.8 GM/DL
GLUCOSE SERPL-MCNC: 77 MG/DL (ref 65–99)
HCT VFR BLD AUTO: 34 % (ref 34–46.6)
HDLC SERPL-MCNC: 73 MG/DL (ref 40–60)
HGB BLD-MCNC: 10.6 G/DL (ref 12–15.9)
LDLC SERPL CALC-MCNC: 121 MG/DL (ref 0–100)
LDLC/HDLC SERPL: 1.64 {RATIO}
MCH RBC QN AUTO: 25.9 PG (ref 26.6–33)
MCHC RBC AUTO-ENTMCNC: 31.2 G/DL (ref 31.5–35.7)
MCV RBC AUTO: 83.1 FL (ref 79–97)
PLATELET # BLD AUTO: 382 10*3/MM3 (ref 140–450)
PMV BLD AUTO: 9.3 FL (ref 6–12)
POTASSIUM SERPL-SCNC: 4 MMOL/L (ref 3.5–5.2)
PROT SERPL-MCNC: 6.7 G/DL (ref 6–8.5)
RBC # BLD AUTO: 4.09 10*6/MM3 (ref 3.77–5.28)
SODIUM SERPL-SCNC: 139 MMOL/L (ref 136–145)
TRIGL SERPL-MCNC: 45 MG/DL (ref 0–150)
TSH SERPL DL<=0.05 MIU/L-ACNC: 0.73 UIU/ML (ref 0.27–4.2)
VLDLC SERPL-MCNC: 8 MG/DL (ref 5–40)
WBC NRBC COR # BLD AUTO: 5.04 10*3/MM3 (ref 3.4–10.8)

## 2025-06-17 PROCEDURE — 85027 COMPLETE CBC AUTOMATED: CPT

## 2025-06-17 PROCEDURE — 82306 VITAMIN D 25 HYDROXY: CPT

## 2025-06-17 PROCEDURE — 80061 LIPID PANEL: CPT

## 2025-06-17 PROCEDURE — 84443 ASSAY THYROID STIM HORMONE: CPT

## 2025-06-17 PROCEDURE — 80053 COMPREHEN METABOLIC PANEL: CPT

## 2025-06-17 PROCEDURE — 36415 COLL VENOUS BLD VENIPUNCTURE: CPT

## 2025-06-24 ENCOUNTER — OFFICE VISIT (OUTPATIENT)
Dept: FAMILY MEDICINE CLINIC | Facility: CLINIC | Age: 40
End: 2025-06-24
Payer: MEDICAID

## 2025-06-24 VITALS
BODY MASS INDEX: 26.05 KG/M2 | SYSTOLIC BLOOD PRESSURE: 118 MMHG | HEIGHT: 63 IN | OXYGEN SATURATION: 98 % | WEIGHT: 147 LBS | HEART RATE: 99 BPM | TEMPERATURE: 97.6 F | DIASTOLIC BLOOD PRESSURE: 64 MMHG

## 2025-06-24 DIAGNOSIS — Z00.00 WELL ADULT EXAM: Primary | ICD-10-CM

## 2025-06-24 DIAGNOSIS — E03.9 HYPOTHYROIDISM, UNSPECIFIED TYPE: ICD-10-CM

## 2025-06-24 DIAGNOSIS — M26.609 TMJ (TEMPOROMANDIBULAR JOINT DISORDER): ICD-10-CM

## 2025-06-24 DIAGNOSIS — F41.9 ANXIETY: ICD-10-CM

## 2025-06-24 PROCEDURE — 3078F DIAST BP <80 MM HG: CPT | Performed by: NURSE PRACTITIONER

## 2025-06-24 PROCEDURE — 99396 PREV VISIT EST AGE 40-64: CPT | Performed by: NURSE PRACTITIONER

## 2025-06-24 PROCEDURE — 1159F MED LIST DOCD IN RCRD: CPT | Performed by: NURSE PRACTITIONER

## 2025-06-24 PROCEDURE — 1125F AMNT PAIN NOTED PAIN PRSNT: CPT | Performed by: NURSE PRACTITIONER

## 2025-06-24 PROCEDURE — 3074F SYST BP LT 130 MM HG: CPT | Performed by: NURSE PRACTITIONER

## 2025-06-24 PROCEDURE — 1160F RVW MEDS BY RX/DR IN RCRD: CPT | Performed by: NURSE PRACTITIONER

## 2025-06-24 RX ORDER — BUPROPION HYDROCHLORIDE 150 MG/1
150 TABLET ORAL EVERY MORNING
Qty: 90 TABLET | Refills: 1 | Status: SHIPPED | OUTPATIENT
Start: 2025-06-24

## 2025-06-24 RX ORDER — LEVOTHYROXINE SODIUM 112 UG/1
112 TABLET ORAL DAILY
Qty: 90 TABLET | Refills: 1 | Status: SHIPPED | OUTPATIENT
Start: 2025-06-24

## 2025-06-24 NOTE — PROGRESS NOTES
"Chief Complaint  Annual Exam    Subjective         Jeni De Santiago presents to [facility]  Patient presents to the office for an annual physical.  I did review recent lab work with her.  I also explained that she is due for her mammogram as well as her Pap.  She does state that she prefers a female to do this and I explained that we can get her set up with one of the female nurse practitioners in the office.  Patient does complain of bilateral popping of her TMJ.  States that this has been going on for a couple years and is becoming more painful.  She states that when she first wakes up it is more painful when she starts to chew.  She denies using any type of bite guard at this time.  I did discuss getting her set up with a specialist to further evaluate and treat.           Objective     Vitals:    06/24/25 0820   BP: 118/64   BP Location: Right arm   Patient Position: Sitting   Cuff Size: Adult   Pulse: 99   Temp: 97.6 °F (36.4 °C)   TempSrc: Temporal   SpO2: 98%   Weight: 66.7 kg (147 lb)   Height: 160 cm (63\")      Body mass index is 26.04 kg/m².    BMI is >= 25 and <30. (Overweight) The following options were offered after discussion;: nutrition counseling/recommendations        Physical Exam  Vitals reviewed.   Constitutional:       Appearance: Normal appearance.   HENT:      Head: Normocephalic and atraumatic.      Jaw: Pain on movement present.        Comments: Tenderness and popping noted      Right Ear: Tympanic membrane, ear canal and external ear normal.      Left Ear: Tympanic membrane, ear canal and external ear normal.      Nose: Nose normal.      Mouth/Throat:      Mouth: Mucous membranes are moist.      Pharynx: Oropharynx is clear.   Eyes:      Extraocular Movements: Extraocular movements intact.      Conjunctiva/sclera: Conjunctivae normal.      Pupils: Pupils are equal, round, and reactive to light.   Cardiovascular:      Rate and Rhythm: Normal rate and regular " rhythm.      Pulses: Normal pulses.      Heart sounds: Normal heart sounds, S1 normal and S2 normal. No murmur heard.  Pulmonary:      Effort: Pulmonary effort is normal. No respiratory distress.      Breath sounds: Normal breath sounds.   Abdominal:      General: Abdomen is flat. Bowel sounds are normal.      Palpations: Abdomen is soft.   Musculoskeletal:         General: Normal range of motion.      Cervical back: Normal range of motion and neck supple.   Skin:     General: Skin is warm and dry.   Neurological:      General: No focal deficit present.      Mental Status: She is alert and oriented to person, place, and time.   Psychiatric:         Attention and Perception: Attention normal.         Mood and Affect: Mood normal.         Behavior: Behavior normal.          Result Review :   The following data was reviewed by: BRETT Orourke on 06/24/2025:      Procedures    Assessment and Plan   Diagnoses and all orders for this visit:    1. Well adult exam (Primary)    2. TMJ (temporomandibular joint disorder)  -     Ambulatory Referral to Physical Therapy for Evaluation & Treatment    3. Hypothyroidism, unspecified type  -     levothyroxine (SYNTHROID, LEVOTHROID) 112 MCG tablet; Take 1 tablet by mouth Daily.  Dispense: 90 tablet; Refill: 1    4. Anxiety  -     buPROPion XL (WELLBUTRIN XL) 150 MG 24 hr tablet; Take 1 tablet by mouth Every Morning.  Dispense: 90 tablet; Refill: 1      Preventative counseling includes healthy diet and exercise.  Also discussed breast cancer screening as well as cervical cancer screening.    Follow Up   Return in about 1 year (around 6/24/2026) for Annual physical.  Patient was given instructions and counseling regarding her condition or for health maintenance advice. Please see specific information pulled into the AVS if appropriate.

## 2025-07-08 DIAGNOSIS — E55.9 VITAMIN D DEFICIENCY: ICD-10-CM

## 2025-07-08 RX ORDER — ACETAMINOPHEN 160 MG
2000 TABLET,DISINTEGRATING ORAL DAILY
Qty: 30 CAPSULE | Refills: 1 | Status: SHIPPED | OUTPATIENT
Start: 2025-07-08

## 2025-07-08 NOTE — TELEPHONE ENCOUNTER
Caller: Jeni De Santiago    Relationship: Self    Best call back number: [phone]    Requested Prescriptions:   Requested Prescriptions     Pending Prescriptions Disp Refills    Cholecalciferol (Vitamin D3) 50 MCG (2000 UT) capsule 30 capsule 1     Sig: Take 1 capsule by mouth Daily.        Pharmacy where request should be sent: [address], [place] [address] - [phone] [facility] [phone]      Last office visit with prescribing clinician: 6/24/2025   Last telemedicine visit with prescribing clinician: Visit date not found   Next office visit with prescribing clinician: 7/28/2025     Does the patient have less than a 3 day supply:  [x] Yes  [] No    Would you like a call back once the refill request has been completed: [] Yes [x] No    If the office needs to give you a call back, can they leave a voicemail: [] Yes [x] No    Abner Le Rep   07/08/25 13:55 EDT